# Patient Record
Sex: FEMALE | Race: WHITE | NOT HISPANIC OR LATINO | Employment: FULL TIME | URBAN - METROPOLITAN AREA
[De-identification: names, ages, dates, MRNs, and addresses within clinical notes are randomized per-mention and may not be internally consistent; named-entity substitution may affect disease eponyms.]

---

## 2017-02-10 ENCOUNTER — OFFICE VISIT (OUTPATIENT)
Dept: OBSTETRICS AND GYNECOLOGY | Facility: CLINIC | Age: 22
End: 2017-02-10

## 2017-02-10 VITALS
BODY MASS INDEX: 34.88 KG/M2 | SYSTOLIC BLOOD PRESSURE: 120 MMHG | HEIGHT: 67 IN | WEIGHT: 222.2 LBS | DIASTOLIC BLOOD PRESSURE: 78 MMHG

## 2017-02-10 DIAGNOSIS — N63.0 BREAST LUMP: Primary | ICD-10-CM

## 2017-02-10 PROCEDURE — 99213 OFFICE O/P EST LOW 20 MIN: CPT | Performed by: NURSE PRACTITIONER

## 2017-02-10 NOTE — PROGRESS NOTES
Subjective   Ashley Lobo is a 21 y.o. female presents with c/o left breast lump    History of Present Illness  Ashley noticed a left breast lump about a month ago. She says the area is tender with palpation. She denies right breast lumps. She denies skin changes, dimpling, nipple retraction, swelling, erythema, and nipple discharge bilaterally. She denies fever. Denies family hx breast cancer. Last annual exam with Dr. Muir about a year ago per pt. No other complaints today.     The following portions of the patient's history were reviewed and updated as appropriate: allergies, current medications, past family history, past medical history, past social history, past surgical history and problem list.    Review of Systems  See HPI    Objective   Physical Exam   Pulmonary/Chest: Right breast exhibits no inverted nipple, no nipple discharge, no skin change and no tenderness. Left breast exhibits mass. Left breast exhibits no inverted nipple, no nipple discharge, no skin change and no tenderness. Breasts are symmetrical. There is no breast swelling.       2cm mobile, tender lump noted 1:00 left breast several inches from nipple.          Assessment/Plan   Ashley was seen today for breast mass.    Diagnoses and all orders for this visit:    Breast lump  -     US breast left complete      We will arrange a left breast U/S for Ashley. She will f/u in 3-4 weeks for her annual exam and a breast recheck. Recommend decrease caffeine. Ashley and her mother verbalize understanding and agree with plan.    SULMA Bolanos

## 2017-02-20 ENCOUNTER — HOSPITAL ENCOUNTER (OUTPATIENT)
Dept: ULTRASOUND IMAGING | Facility: HOSPITAL | Age: 22
Discharge: HOME | End: 2017-02-20
Admitting: NURSE PRACTITIONER

## 2017-02-20 PROCEDURE — 76641 ULTRASOUND BREAST COMPLETE: CPT

## 2017-02-21 ENCOUNTER — TELEPHONE (OUTPATIENT)
Dept: OBSTETRICS AND GYNECOLOGY | Facility: CLINIC | Age: 22
End: 2017-02-21

## 2017-02-21 DIAGNOSIS — N63.0 BREAST LUMP: Primary | ICD-10-CM

## 2017-02-21 NOTE — TELEPHONE ENCOUNTER
Pt's mother informed of date, time, and location for appt with Dr. Toribio. She verbalizes understanding.

## 2017-03-03 ENCOUNTER — OFFICE VISIT (OUTPATIENT)
Dept: MAMMOGRAPHY | Facility: CLINIC | Age: 22
End: 2017-03-03

## 2017-03-03 VITALS
DIASTOLIC BLOOD PRESSURE: 75 MMHG | OXYGEN SATURATION: 96 % | SYSTOLIC BLOOD PRESSURE: 105 MMHG | BODY MASS INDEX: 36 KG/M2 | HEIGHT: 66 IN | HEART RATE: 96 BPM | TEMPERATURE: 97.9 F | WEIGHT: 224 LBS

## 2017-03-03 DIAGNOSIS — N63.0 LUMP OR MASS IN BREAST: Primary | ICD-10-CM

## 2017-03-03 PROCEDURE — 99204 OFFICE O/P NEW MOD 45 MIN: CPT | Performed by: SURGERY

## 2017-03-03 RX ORDER — DIAZEPAM 2 MG/1
10 TABLET ORAL ONCE
Status: CANCELLED | OUTPATIENT
Start: 2017-03-03 | End: 2017-03-03

## 2017-03-03 RX ORDER — CEFAZOLIN SODIUM 2 G/100ML
2 INJECTION, SOLUTION INTRAVENOUS ONCE
Status: CANCELLED | OUTPATIENT
Start: 2017-03-03 | End: 2017-03-03

## 2017-03-03 NOTE — PROGRESS NOTES
Chief Complaint: Ashley Lobo is a 21 y.o.. female here today for Cyst (left breast)        History of Present Illness:  Patient presents with breast mass x2.  Approximately 1 month ago she rubs her arm against the lateral aspect of the left breast and noticed a lump there.  It was initially not tender but has become so with frequent evaluation.  It is not associated in anyway with her menstrual cycle and she has had no prior breast lumps.  The patient describes the lump as the size of an almond.  Imaging studies in the form of an ultrasound have been performed and in the 1 o'clock position of the left breast there was a 2.2 2.1 solid mass that was well circumscribed.  There was a second nodule in the 12:00 retroareolar location that measured 1.7 x 1.8 cm.  It was also smooth but solid.  I have personally reviewed these imaging studies and agree with the reports.  The family history is negative for breast cancer.  There was a great-grandmother with colon cancer.      Review of Systems:  Review of Systems   All other systems reviewed and are negative.       Past Medical and Surgical History:  Breast Biopsy History:  Patient has not had a breast biopsy in the past.  Breast Cancer HIstory:  Patient does not have a past medical history of breast cancer.  Breast Operations, and year:  None    History   Smoking Status   • Never Smoker   Smokeless Tobacco   • Not on file     Obstetric History:  Patient is premenopausal, first day of last period: Feb 2017 sometime  Number of pregnancies:0  Number of live births: 0  Number of abortions or miscarriages: 0  Length of time taking birth control pills:1 year  Patient has never taken hormone replacement    Past Surgical History   Procedure Laterality Date   • Back surgery         Past Medical History   Diagnosis Date   • Scoliosis        Prior Hospitalizations, other than for surgery or childbirth, and year:  none    Social History:  Patient is .  Patient has no  children.    Family History:  Family History   Problem Relation Age of Onset   • Colon cancer Other    • Diabetes Mother    • Hyperlipidemia Mother    • Hypertension Mother    • Hyperlipidemia Father    • COPD Maternal Grandmother      great grandmother   • Depression Maternal Grandmother    • Hearing loss Maternal Grandmother    • Heart attack Maternal Grandfather    • Hyperlipidemia Maternal Grandfather    • Alcohol abuse Paternal Grandfather    • Birth defects Paternal Grandfather    • Heart attack Paternal Grandfather    • Hyperlipidemia Paternal Grandfather        Vital Signs:  Vitals:    03/03/17 1024   BP: 105/75   Pulse: 96   Temp: 97.9 °F (36.6 °C)   SpO2: 96%       Medications:    Current Outpatient Prescriptions:   •  Prenatal Vit-Min-FA-Fish Oil ([employer] PRENATAL GUMMY PO), Take 2 tablets by mouth daily., Disp: , Rfl:      Physical Examination:  General Appearance:   Patient is in no distress.  She is well kept and has an obese build.   Psychiatric:  Patient with appropriate mood and affect. Alert and oriented to self, time, and place.    Breast, RIGHT:  medium sized, symmetric with the contralateral side.  Breast skin is without erythema, edema, rashes.  There are no visible abnormalities upon inspection during the arm-raising maneuver or with hands on hips in the sitting position. There is no nipple retraction, discharge or nipple/areolar skin changes.There are no masses palpable in the sitting or supine positions.    Breast, LEFT:  medium sized, symmetric with the contralateral side.  Breast skin is without erythema, edema, rashes.  There are no visible abnormalities upon inspection during the arm-raising maneuver or with hands on hips in the sitting position. There is no nipple retraction, discharge or nipple/areolar skin changes.There is a 2 x 1.5 cm smooth mobile mass in the upper outer quadrant of the left breast approximately 2 fingerbreadths from the edge of the areola.  There is a questionable 1 cm  nodule in the 11 o'clock position behind the areola.    Lymphatic:  There is no axillary, cervical, infraclavicular, or supraclavicular adenopathy bilaterally.  Eyes:  Pupils are round and reactive to light.  Cardiovascular:  Heart rate and rhythm are regular.  Respiratory:  Lungs are clear bilaterally with no crackles or wheezes in any lung field.  Gastrointestinal:  Abdomen is soft, nondistended, and nontender.  There is no evidence of hepatosplenomegaly.  There are no scars from previous surgery.    Musculoskeletal:  Good strength in all 4 extremities.   There is good range of motion in both shoulders.    Skin:  The patient has several tattoos.    Assessment:  Diagnoses and all orders for this visit:    Lump or mass in breast      Plan:  He was accompanied by her mother today.  I gave her the option of a needle biopsy of one of these lesions and if it proved to be a fibroadenoma we could just watch both of them with physical exam and ultrasound.  The other option would be to excise the mass and if we did that I would recommend removing both of them rather than having to continue following the lesion we did not remove.  I believe these lesions both represent fibroadenomas which would be the expected pathology given her age group and the imaging findings.  She prefers to excise these lesions and we will set her up for that.  She understands that the smaller lesion will require ultrasound localization to be certain we are finding that.  The larger one is easily palpable and doesn't require any localization.      CPT coding:    Next Appointment:  No Follow-up on file.

## 2017-03-27 ENCOUNTER — APPOINTMENT (OUTPATIENT)
Dept: PREADMISSION TESTING | Facility: HOSPITAL | Age: 22
End: 2017-03-27

## 2017-03-27 VITALS
DIASTOLIC BLOOD PRESSURE: 87 MMHG | RESPIRATION RATE: 16 BRPM | OXYGEN SATURATION: 100 % | SYSTOLIC BLOOD PRESSURE: 126 MMHG | BODY MASS INDEX: 35.68 KG/M2 | WEIGHT: 222 LBS | TEMPERATURE: 98.1 F | HEART RATE: 99 BPM | HEIGHT: 66 IN

## 2017-03-27 LAB
DEPRECATED RDW RBC AUTO: 42.1 FL (ref 37–54)
ERYTHROCYTE [DISTWIDTH] IN BLOOD BY AUTOMATED COUNT: 13 % (ref 11.7–13)
HCT VFR BLD AUTO: 42.5 % (ref 35.6–45.5)
HGB BLD-MCNC: 14.2 G/DL (ref 11.9–15.5)
MCH RBC QN AUTO: 29.5 PG (ref 26.9–32)
MCHC RBC AUTO-ENTMCNC: 33.4 G/DL (ref 32.4–36.3)
MCV RBC AUTO: 88.2 FL (ref 80.5–98.2)
PLATELET # BLD AUTO: 280 10*3/MM3 (ref 140–500)
PMV BLD AUTO: 9.8 FL (ref 6–12)
RBC # BLD AUTO: 4.82 10*6/MM3 (ref 3.9–5.2)
WBC NRBC COR # BLD: 8.11 10*3/MM3 (ref 4.5–10.7)

## 2017-03-27 PROCEDURE — 93005 ELECTROCARDIOGRAM TRACING: CPT

## 2017-03-27 PROCEDURE — 36415 COLL VENOUS BLD VENIPUNCTURE: CPT

## 2017-03-27 PROCEDURE — 93010 ELECTROCARDIOGRAM REPORT: CPT | Performed by: INTERNAL MEDICINE

## 2017-03-27 PROCEDURE — 85027 COMPLETE CBC AUTOMATED: CPT | Performed by: SURGERY

## 2017-04-03 ENCOUNTER — ANESTHESIA (OUTPATIENT)
Dept: PERIOP | Facility: HOSPITAL | Age: 22
End: 2017-04-03

## 2017-04-03 ENCOUNTER — HOSPITAL ENCOUNTER (OUTPATIENT)
Dept: ULTRASOUND IMAGING | Facility: HOSPITAL | Age: 22
Discharge: HOME | End: 2017-04-03
Attending: SURGERY

## 2017-04-03 ENCOUNTER — ANESTHESIA EVENT (OUTPATIENT)
Dept: PERIOP | Facility: HOSPITAL | Age: 22
End: 2017-04-03

## 2017-04-03 ENCOUNTER — HOSPITAL ENCOUNTER (OUTPATIENT)
Facility: HOSPITAL | Age: 22
Setting detail: HOSPITAL OUTPATIENT SURGERY
Discharge: HOME | End: 2017-04-03
Attending: SURGERY | Admitting: SURGERY

## 2017-04-03 VITALS
SYSTOLIC BLOOD PRESSURE: 122 MMHG | RESPIRATION RATE: 16 BRPM | TEMPERATURE: 97.1 F | HEART RATE: 78 BPM | DIASTOLIC BLOOD PRESSURE: 84 MMHG | OXYGEN SATURATION: 99 %

## 2017-04-03 DIAGNOSIS — N63.0 LUMP OR MASS IN BREAST: ICD-10-CM

## 2017-04-03 LAB
B-HCG UR QL: NEGATIVE
INTERNAL NEGATIVE CONTROL: NEGATIVE
INTERNAL POSITIVE CONTROL: POSITIVE
Lab: NORMAL

## 2017-04-03 PROCEDURE — 88305 TISSUE EXAM BY PATHOLOGIST: CPT | Performed by: SURGERY

## 2017-04-03 PROCEDURE — 25010000002 ONDANSETRON PER 1 MG: Performed by: NURSE ANESTHETIST, CERTIFIED REGISTERED

## 2017-04-03 PROCEDURE — 25010000002 FENTANYL CITRATE (PF) 100 MCG/2ML SOLUTION: Performed by: ANESTHESIOLOGY

## 2017-04-03 PROCEDURE — 25010000002 DEXAMETHASONE PER 1 MG: Performed by: NURSE ANESTHETIST, CERTIFIED REGISTERED

## 2017-04-03 PROCEDURE — 25010000002 FENTANYL CITRATE (PF) 100 MCG/2ML SOLUTION: Performed by: NURSE ANESTHETIST, CERTIFIED REGISTERED

## 2017-04-03 PROCEDURE — 25010000002 PROPOFOL 10 MG/ML EMULSION: Performed by: NURSE ANESTHETIST, CERTIFIED REGISTERED

## 2017-04-03 PROCEDURE — 25010000003 CEFAZOLIN IN DEXTROSE 2-4 GM/100ML-% SOLUTION: Performed by: SURGERY

## 2017-04-03 PROCEDURE — 25010000002 PHENYLEPHRINE PER 1 ML: Performed by: NURSE ANESTHETIST, CERTIFIED REGISTERED

## 2017-04-03 PROCEDURE — 25010000002 MIDAZOLAM PER 1 MG: Performed by: ANESTHESIOLOGY

## 2017-04-03 PROCEDURE — 19120 REMOVAL OF BREAST LESION: CPT | Performed by: SURGERY

## 2017-04-03 PROCEDURE — 25010000002 KETOROLAC TROMETHAMINE PER 15 MG: Performed by: NURSE ANESTHETIST, CERTIFIED REGISTERED

## 2017-04-03 RX ORDER — OXYCODONE AND ACETAMINOPHEN 7.5; 325 MG/1; MG/1
1 TABLET ORAL ONCE AS NEEDED
Status: DISCONTINUED | OUTPATIENT
Start: 2017-04-03 | End: 2017-04-03 | Stop reason: HOSPADM

## 2017-04-03 RX ORDER — LIDOCAINE HYDROCHLORIDE 20 MG/ML
INJECTION, SOLUTION INFILTRATION; PERINEURAL AS NEEDED
Status: DISCONTINUED | OUTPATIENT
Start: 2017-04-03 | End: 2017-04-03 | Stop reason: SURG

## 2017-04-03 RX ORDER — PROMETHAZINE HYDROCHLORIDE 25 MG/1
25 TABLET ORAL ONCE AS NEEDED
Status: DISCONTINUED | OUTPATIENT
Start: 2017-04-03 | End: 2017-04-03 | Stop reason: HOSPADM

## 2017-04-03 RX ORDER — LIDOCAINE HYDROCHLORIDE 10 MG/ML
20 INJECTION, SOLUTION INFILTRATION; PERINEURAL ONCE
Status: COMPLETED | OUTPATIENT
Start: 2017-04-03 | End: 2017-04-03

## 2017-04-03 RX ORDER — NALOXONE HCL 0.4 MG/ML
0.2 VIAL (ML) INJECTION AS NEEDED
Status: DISCONTINUED | OUTPATIENT
Start: 2017-04-03 | End: 2017-04-03 | Stop reason: HOSPADM

## 2017-04-03 RX ORDER — PROMETHAZINE HYDROCHLORIDE 25 MG/ML
12.5 INJECTION, SOLUTION INTRAMUSCULAR; INTRAVENOUS ONCE AS NEEDED
Status: DISCONTINUED | OUTPATIENT
Start: 2017-04-03 | End: 2017-04-03 | Stop reason: HOSPADM

## 2017-04-03 RX ORDER — PROPOFOL 10 MG/ML
VIAL (ML) INTRAVENOUS AS NEEDED
Status: DISCONTINUED | OUTPATIENT
Start: 2017-04-03 | End: 2017-04-03 | Stop reason: SURG

## 2017-04-03 RX ORDER — HYDROCODONE BITARTRATE AND ACETAMINOPHEN 7.5; 325 MG/1; MG/1
1 TABLET ORAL ONCE AS NEEDED
Status: COMPLETED | OUTPATIENT
Start: 2017-04-03 | End: 2017-04-03

## 2017-04-03 RX ORDER — MAGNESIUM HYDROXIDE 1200 MG/15ML
LIQUID ORAL AS NEEDED
Status: DISCONTINUED | OUTPATIENT
Start: 2017-04-03 | End: 2017-04-03 | Stop reason: HOSPADM

## 2017-04-03 RX ORDER — FENTANYL CITRATE 50 UG/ML
INJECTION, SOLUTION INTRAMUSCULAR; INTRAVENOUS AS NEEDED
Status: DISCONTINUED | OUTPATIENT
Start: 2017-04-03 | End: 2017-04-03 | Stop reason: SURG

## 2017-04-03 RX ORDER — FENTANYL CITRATE 50 UG/ML
50 INJECTION, SOLUTION INTRAMUSCULAR; INTRAVENOUS
Status: DISCONTINUED | OUTPATIENT
Start: 2017-04-03 | End: 2017-04-03 | Stop reason: HOSPADM

## 2017-04-03 RX ORDER — PROMETHAZINE HYDROCHLORIDE 25 MG/1
25 SUPPOSITORY RECTAL ONCE AS NEEDED
Status: DISCONTINUED | OUTPATIENT
Start: 2017-04-03 | End: 2017-04-03 | Stop reason: HOSPADM

## 2017-04-03 RX ORDER — MIDAZOLAM HYDROCHLORIDE 1 MG/ML
1 INJECTION INTRAMUSCULAR; INTRAVENOUS
Status: DISCONTINUED | OUTPATIENT
Start: 2017-04-03 | End: 2017-04-03 | Stop reason: HOSPADM

## 2017-04-03 RX ORDER — HYDRALAZINE HYDROCHLORIDE 20 MG/ML
5 INJECTION INTRAMUSCULAR; INTRAVENOUS
Status: DISCONTINUED | OUTPATIENT
Start: 2017-04-03 | End: 2017-04-03 | Stop reason: HOSPADM

## 2017-04-03 RX ORDER — LABETALOL HYDROCHLORIDE 5 MG/ML
5 INJECTION, SOLUTION INTRAVENOUS
Status: DISCONTINUED | OUTPATIENT
Start: 2017-04-03 | End: 2017-04-03 | Stop reason: HOSPADM

## 2017-04-03 RX ORDER — DIPHENHYDRAMINE HYDROCHLORIDE 50 MG/ML
12.5 INJECTION INTRAMUSCULAR; INTRAVENOUS
Status: DISCONTINUED | OUTPATIENT
Start: 2017-04-03 | End: 2017-04-03 | Stop reason: HOSPADM

## 2017-04-03 RX ORDER — SODIUM CHLORIDE, SODIUM LACTATE, POTASSIUM CHLORIDE, CALCIUM CHLORIDE 600; 310; 30; 20 MG/100ML; MG/100ML; MG/100ML; MG/100ML
9 INJECTION, SOLUTION INTRAVENOUS CONTINUOUS
Status: DISCONTINUED | OUTPATIENT
Start: 2017-04-03 | End: 2017-04-03 | Stop reason: HOSPADM

## 2017-04-03 RX ORDER — MIDAZOLAM HYDROCHLORIDE 1 MG/ML
2 INJECTION INTRAMUSCULAR; INTRAVENOUS
Status: DISCONTINUED | OUTPATIENT
Start: 2017-04-03 | End: 2017-04-03 | Stop reason: HOSPADM

## 2017-04-03 RX ORDER — BUPIVACAINE HYDROCHLORIDE 2.5 MG/ML
INJECTION, SOLUTION INFILTRATION; PERINEURAL AS NEEDED
Status: DISCONTINUED | OUTPATIENT
Start: 2017-04-03 | End: 2017-04-03 | Stop reason: HOSPADM

## 2017-04-03 RX ORDER — FLUMAZENIL 0.1 MG/ML
0.2 INJECTION INTRAVENOUS AS NEEDED
Status: DISCONTINUED | OUTPATIENT
Start: 2017-04-03 | End: 2017-04-03 | Stop reason: HOSPADM

## 2017-04-03 RX ORDER — CEFAZOLIN SODIUM 2 G/100ML
2 INJECTION, SOLUTION INTRAVENOUS ONCE
Status: COMPLETED | OUTPATIENT
Start: 2017-04-03 | End: 2017-04-03

## 2017-04-03 RX ORDER — HYDROMORPHONE HYDROCHLORIDE 1 MG/ML
0.5 INJECTION, SOLUTION INTRAMUSCULAR; INTRAVENOUS; SUBCUTANEOUS
Status: DISCONTINUED | OUTPATIENT
Start: 2017-04-03 | End: 2017-04-03 | Stop reason: HOSPADM

## 2017-04-03 RX ORDER — PROMETHAZINE HYDROCHLORIDE 25 MG/1
12.5 TABLET ORAL ONCE AS NEEDED
Status: DISCONTINUED | OUTPATIENT
Start: 2017-04-03 | End: 2017-04-03 | Stop reason: HOSPADM

## 2017-04-03 RX ORDER — DEXAMETHASONE SODIUM PHOSPHATE 10 MG/ML
INJECTION INTRAMUSCULAR; INTRAVENOUS AS NEEDED
Status: DISCONTINUED | OUTPATIENT
Start: 2017-04-03 | End: 2017-04-03 | Stop reason: SURG

## 2017-04-03 RX ORDER — ONDANSETRON 2 MG/ML
4 INJECTION INTRAMUSCULAR; INTRAVENOUS ONCE AS NEEDED
Status: DISCONTINUED | OUTPATIENT
Start: 2017-04-03 | End: 2017-04-03 | Stop reason: HOSPADM

## 2017-04-03 RX ORDER — KETOROLAC TROMETHAMINE 30 MG/ML
INJECTION, SOLUTION INTRAMUSCULAR; INTRAVENOUS AS NEEDED
Status: DISCONTINUED | OUTPATIENT
Start: 2017-04-03 | End: 2017-04-03 | Stop reason: SURG

## 2017-04-03 RX ORDER — HYDROCODONE BITARTRATE AND ACETAMINOPHEN 5; 325 MG/1; MG/1
1-2 TABLET ORAL EVERY 4 HOURS PRN
Qty: 20 TABLET | Refills: 0 | Status: SHIPPED | OUTPATIENT
Start: 2017-04-03 | End: 2021-09-13

## 2017-04-03 RX ORDER — FAMOTIDINE 10 MG/ML
20 INJECTION, SOLUTION INTRAVENOUS ONCE
Status: COMPLETED | OUTPATIENT
Start: 2017-04-03 | End: 2017-04-03

## 2017-04-03 RX ORDER — SODIUM CHLORIDE 0.9 % (FLUSH) 0.9 %
1-10 SYRINGE (ML) INJECTION AS NEEDED
Status: DISCONTINUED | OUTPATIENT
Start: 2017-04-03 | End: 2017-04-03 | Stop reason: HOSPADM

## 2017-04-03 RX ORDER — DIAZEPAM 2 MG/1
10 TABLET ORAL ONCE
Status: COMPLETED | OUTPATIENT
Start: 2017-04-03 | End: 2017-04-03

## 2017-04-03 RX ORDER — ONDANSETRON 2 MG/ML
INJECTION INTRAMUSCULAR; INTRAVENOUS AS NEEDED
Status: DISCONTINUED | OUTPATIENT
Start: 2017-04-03 | End: 2017-04-03 | Stop reason: SURG

## 2017-04-03 RX ORDER — DIAZEPAM 5 MG/1
10 TABLET ORAL ONCE AS NEEDED
Status: DISCONTINUED | OUTPATIENT
Start: 2017-04-03 | End: 2017-04-03 | Stop reason: HOSPADM

## 2017-04-03 RX ADMIN — LIDOCAINE HYDROCHLORIDE 2 ML: 10 INJECTION, SOLUTION INFILTRATION; PERINEURAL at 12:37

## 2017-04-03 RX ADMIN — PHENYLEPHRINE HYDROCHLORIDE 100 MCG: 10 INJECTION INTRAVENOUS at 14:27

## 2017-04-03 RX ADMIN — PHENYLEPHRINE HYDROCHLORIDE 100 MCG: 10 INJECTION INTRAVENOUS at 14:33

## 2017-04-03 RX ADMIN — FENTANYL CITRATE 50 MCG: 50 INJECTION INTRAMUSCULAR; INTRAVENOUS at 13:59

## 2017-04-03 RX ADMIN — HYDROCODONE BITARTRATE AND ACETAMINOPHEN 1 TABLET: 7.5; 325 TABLET ORAL at 15:17

## 2017-04-03 RX ADMIN — PROPOFOL 200 MG: 10 INJECTION, EMULSION INTRAVENOUS at 13:42

## 2017-04-03 RX ADMIN — DIAZEPAM 10 MG: 5 TABLET ORAL at 10:51

## 2017-04-03 RX ADMIN — FENTANYL CITRATE 50 MCG: 50 INJECTION INTRAMUSCULAR; INTRAVENOUS at 12:49

## 2017-04-03 RX ADMIN — FAMOTIDINE 20 MG: 10 INJECTION, SOLUTION INTRAVENOUS at 12:50

## 2017-04-03 RX ADMIN — MIDAZOLAM 2 MG: 1 INJECTION INTRAMUSCULAR; INTRAVENOUS at 12:50

## 2017-04-03 RX ADMIN — FENTANYL CITRATE 50 MCG: 50 INJECTION INTRAMUSCULAR; INTRAVENOUS at 13:52

## 2017-04-03 RX ADMIN — DEXAMETHASONE SODIUM PHOSPHATE 8 MG: 10 INJECTION INTRAMUSCULAR; INTRAVENOUS at 13:52

## 2017-04-03 RX ADMIN — SODIUM CHLORIDE, POTASSIUM CHLORIDE, SODIUM LACTATE AND CALCIUM CHLORIDE: 600; 310; 30; 20 INJECTION, SOLUTION INTRAVENOUS at 13:37

## 2017-04-03 RX ADMIN — FENTANYL CITRATE 50 MCG: 50 INJECTION INTRAMUSCULAR; INTRAVENOUS at 14:06

## 2017-04-03 RX ADMIN — LIDOCAINE HYDROCHLORIDE 60 MG: 20 INJECTION, SOLUTION INFILTRATION; PERINEURAL at 13:42

## 2017-04-03 RX ADMIN — ONDANSETRON 4 MG: 2 INJECTION INTRAMUSCULAR; INTRAVENOUS at 13:52

## 2017-04-03 RX ADMIN — SODIUM CHLORIDE, POTASSIUM CHLORIDE, SODIUM LACTATE AND CALCIUM CHLORIDE 9 ML/HR: 600; 310; 30; 20 INJECTION, SOLUTION INTRAVENOUS at 12:49

## 2017-04-03 RX ADMIN — CEFAZOLIN SODIUM 2 G: 2 INJECTION, SOLUTION INTRAVENOUS at 13:38

## 2017-04-03 RX ADMIN — FENTANYL CITRATE 50 MCG: 50 INJECTION INTRAMUSCULAR; INTRAVENOUS at 13:42

## 2017-04-03 RX ADMIN — KETOROLAC TROMETHAMINE 30 MG: 30 INJECTION, SOLUTION INTRAMUSCULAR; INTRAVENOUS at 13:52

## 2017-04-03 NOTE — OP NOTE
Needle Localization Breast Biopsy Procedure Note    Indications: This patient presented with a palpable mass in the upper outer quadrant of the left breast.  Ultrasound revealed a 2 cm mass most likely a fibroadenoma.  There also found just another somewhat near mass was a little smaller and it was not palpable.  She is here to have removal of the palpable as well as the nonpalpable mass.    Pre-operative Diagnosis: Left breast mass ×2    Post-operative Diagnosis: Same    Procedure-1) Needle localized Left Breast excisional biopsy   2) excisional biopsy left breast mass    Surgeon: Duc Toribio MD.,  FACS    Assistants: None    Anesthesia: General      Procedure Details   The patient was seen in the Holding Room. The risks, benefits, complications, treatment options, and expected outcomes were discussed with the patient. The possibilities of reaction to medication, pulmonary aspiration, bleeding, infection, the need for additional procedures, failure to diagnose a condition, and creating a complication requiring transfusion or operation were discussed with the patient. The patient concurred with the proposed plan, giving informed consent.  The site of surgery properly noted/marked. The patient was taken to Operating Room; identified patient as Ashley Lobo  and the procedure verified as Needle localized left breast biopsy and excisional biopsy of the left breast mass.. A Time Out was held and the above information confirmed.    The patient underwent preoperative guidewire localization of a mammographic abnormality in the  12:00 aspect of the leftbreast.  The patient was brought to the operating room and placed supine.  The breast was prepped and draped in standard fashion. Marcaine  0.50% with epinephrine was used to anesthetize the skin at the site of the guidewire placement.  A curvilinear incision was created along the edge of the areola in the upper outer quadrant.  Dissection was carried down to the  localized area which was completely excised.  The mass was easily palpable and an x-ray was not taken.  We were then able to dissect into the 2 o'clock position through this same incision and identified the second mass which was palpable.  It was dissected from the surrounding tissues and removed intact..  Hemostasis was achieved with cautery.  We irrigated out the operative site.  Closure was performed  with interrupted 3-0 Vicryl sutures for the deeper layers and a 4-0 Vicryl subcuticular closure.      Steri-Strips were applied. At the end of the operation, all sponge, instrument, and needle counts were correct.    Findings:  There were no unexpected findings  Estimated Blood Loss:  Minimal           Drains: none          Specimens: Left breast mass at 12:00 and left breast mass at 2:00                    Complications:  None; patient tolerated the procedure well.           Condition: stable

## 2017-04-03 NOTE — PLAN OF CARE
Problem: Patient Care Overview (Adult)  Goal: Adult Individualization and Mutuality  Outcome: Outcome(s) achieved Date Met:  04/03/17

## 2017-04-03 NOTE — PLAN OF CARE
Problem: Patient Care Overview (Adult)  Goal: Discharge Needs Assessment  Outcome: Outcome(s) achieved Date Met:  04/03/17

## 2017-04-03 NOTE — ANESTHESIA POSTPROCEDURE EVALUATION
Patient: Ashley Lobo    Procedure Summary     Date Anesthesia Start Anesthesia Stop Room / Location    04/03/17 [id number]  [facility]  /  [facility]       Procedure Diagnosis Surgeon Provider    LEFT BREAST BIOPSY WITH ULTRASOUND NEEDLE LOCALIZATION AND EXCISIONAL BIOPSY OF SECOND MASS IN LEFT BREAST (Left Breast) Lump or mass in breast  (Lump or mass in breast [N63]) MD Abram Ruiz MD          Anesthesia Type: general  Last vitals  /84 (04/03/17 1515)    Temp 36.3 °C (97.3 °F) (04/03/17 1444)    Pulse 83 (04/03/17 1515)   Resp 18 (04/03/17 1515)    SpO2 100 % (04/03/17 1515)      Post Anesthesia Care and Evaluation    Patient location during evaluation: PACU  Patient participation: complete - patient participated  Level of consciousness: awake and alert  Pain management: adequate  Airway patency: patent  Anesthetic complications: No anesthetic complications    Cardiovascular status: acceptable  Respiratory status: acceptable  Hydration status: acceptable

## 2017-04-03 NOTE — PLAN OF CARE
Problem: Perioperative Period (Adult)  Goal: Signs and Symptoms of Listed Potential Problems Will be Absent or Manageable (Perioperative Period)  Outcome: Outcome(s) achieved Date Met:  04/03/17 04/03/17 0747   Perioperative Period   Problems Assessed (Perioperative Period) all   Problems Present (Perioperative Period) none

## 2017-04-03 NOTE — PLAN OF CARE
Problem: Patient Care Overview (Adult)  Goal: Plan of Care Review  Outcome: Outcome(s) achieved Date Met:  04/03/17 04/03/17 3527   Coping/Psychosocial Response Interventions   Plan Of Care Reviewed With patient   Patient Care Overview   Progress improving

## 2017-04-03 NOTE — PLAN OF CARE
Problem: Patient Care Overview (Adult)  Goal: Plan of Care Review  Outcome: Ongoing (interventions implemented as appropriate)    04/03/17 1034   Coping/Psychosocial Response Interventions   Plan Of Care Reviewed With patient   Patient Care Overview   Progress improving       Goal: Adult Individualization and Mutuality  Outcome: Ongoing (interventions implemented as appropriate)  Goal: Discharge Needs Assessment  Outcome: Ongoing (interventions implemented as appropriate)    04/03/17 1034   Discharge Needs Assessment   Concerns To Be Addressed no discharge needs identified

## 2017-04-03 NOTE — PLAN OF CARE
Problem: Perioperative Period (Adult)  Goal: Signs and Symptoms of Listed Potential Problems Will be Absent or Manageable (Perioperative Period)  Outcome: Ongoing (interventions implemented as appropriate)    04/03/17 1034   Perioperative Period   Problems Assessed (Perioperative Period) all   Problems Present (Perioperative Period) none

## 2017-04-03 NOTE — PLAN OF CARE
Problem: Perioperative Period (Adult)  Goal: Signs and Symptoms of Listed Potential Problems Will be Absent or Manageable (Perioperative Period)  Outcome: Ongoing (interventions implemented as appropriate)    04/03/17 1458   Perioperative Period   Problems Assessed (Perioperative Period) pain;wound complications;hypothermia;hypoxia/hypoxemia   Problems Present (Perioperative Period) none

## 2017-04-03 NOTE — H&P
History of Present Illness:  Patient presents with breast mass x2.  Approximately 1 month ago she rubs her arm against the lateral aspect of the left breast and noticed a lump there. It was initially not tender but has become so with frequent evaluation. It is not associated in anyway with her menstrual cycle and she has had no prior breast lumps. The patient describes the lump as the size of an almond. Imaging studies in the form of an ultrasound have been performed and in the 1 o'clock position of the left breast there was a 2.2 2.1 solid mass that was well circumscribed. There was a second nodule in the 12:00 retroareolar location that measured 1.7 x 1.8 cm. It was also smooth but solid. I have personally reviewed these imaging studies and agree with the reports.  The family history is negative for breast cancer. There was a great-grandmother with colon cancer.        Review of Systems:  Review of Systems   All other systems reviewed and are negative.        Past Medical and Surgical History:  Breast Biopsy History:  Patient has not had a breast biopsy in the past.  Breast Cancer HIstory:  Patient does not have a past medical history of breast cancer.  Breast Operations, and year:  None         History   Smoking Status   • Never Smoker   Smokeless Tobacco   • Not on file      Obstetric History:  Patient is premenopausal, first day of last period: Feb 2017 sometime  Number of pregnancies:0  Number of live births: 0  Number of abortions or miscarriages: 0  Length of time taking birth control pills:1 year  Patient has never taken hormone replacement      Surgical History          Past Surgical History   Procedure Laterality Date   • Back surgery                 Medical History         Past Medical History   Diagnosis Date   • Scoliosis              Prior Hospitalizations, other than for surgery or childbirth, and year:  none     Social History:  Patient is .  Patient has no children.     Family History:          Family History   Problem Relation Age of Onset   • Colon cancer Other     • Diabetes Mother     • Hyperlipidemia Mother     • Hypertension Mother     • Hyperlipidemia Father     • COPD Maternal Grandmother         great grandmother   • Depression Maternal Grandmother     • Hearing loss Maternal Grandmother     • Heart attack Maternal Grandfather     • Hyperlipidemia Maternal Grandfather     • Alcohol abuse Paternal Grandfather     • Birth defects Paternal Grandfather     • Heart attack Paternal Grandfather     • Hyperlipidemia Paternal Grandfather           Vital Signs:      Vitals: 4/3/2017         BP: 137/95     Pulse: 102     Temp: 98.2     SpO2: 100%           Medications:     Current Outpatient Prescriptions:   • Prenatal Vit-Min-FA-Fish Oil ([employer] PRENATAL GUMMY PO), Take 2 tablets by mouth daily., Disp: , Rfl:      Physical Examination:  General Appearance:   Patient is in no distress.  She is well kept and has an obese build.   Psychiatric:  Patient with appropriate mood and affect. Alert and oriented to self, time, and place.     Breast, RIGHT: medium sized, symmetric with the contralateral side.  Breast skin is without erythema, edema, rashes.  There are no visible abnormalities upon inspection during the arm-raising maneuver or with hands on hips in the sitting position. There is no nipple retraction, discharge or nipple/areolar skin changes.There are no masses palpable in the sitting or supine positions.     Breast, LEFT:  medium sized, symmetric with the contralateral side.  Breast skin is without erythema, edema, rashes.  There are no visible abnormalities upon inspection during the arm-raising maneuver or with hands on hips in the sitting position. There is no nipple retraction, discharge or nipple/areolar skin changes.There is a 2 x 1.5 cm smooth mobile mass in the upper outer quadrant of the left breast approximately 2 fingerbreadths from the edge of the areola. There is a questionable 1 cm nodule  in the 11 o'clock position behind the areola.     Lymphatic:  There is no axillary, cervical, infraclavicular, or supraclavicular adenopathy bilaterally.  Eyes:  Pupils are round and reactive to light.  Cardiovascular:  Heart rate and rhythm are regular.  Respiratory:  Lungs are clear bilaterally with no crackles or wheezes in any lung field.  Gastrointestinal:  Abdomen is soft, nondistended, and nontender.  There is no evidence of hepatosplenomegaly. There are no scars from previous surgery.     Musculoskeletal:  Good strength in all 4 extremities.   There is good range of motion in both shoulders.     Skin:  The patient has several tattoos.     Assessment:  Diagnoses and all orders for this visit:     Lump or mass in breast    Plan-she was given the option of observation versus removal of one of the masses and following the remaining 1 or to remove both of them.  She prefers removal of both of them and understands that the smaller one will require ultrasound localization to be certain we are finding it.  She wishes to proceed and understands the small risk of infection, bleeding, and anesthesia.

## 2017-04-03 NOTE — ANESTHESIA PROCEDURE NOTES
Airway  Airway not difficult    General Information and Staff    Patient location during procedure: OR  Anesthesiologist: MARIAH JONES  CRNA: KE NOVAK    Indications and Patient Condition  Indications for airway management: airway protection    Preoxygenated: yes  MILS maintained throughout  Mask difficulty assessment: 1 - vent by mask    Final Airway Details  Final airway type: supraglottic airway      Successful airway: classic  Size 4    Number of attempts at approach: 1    Additional Comments  Pt preoxygenated, sivi, LMA placed with adequate seal and TV

## 2017-04-05 ENCOUNTER — TELEPHONE (OUTPATIENT)
Dept: SURGERY | Facility: CLINIC | Age: 22
End: 2017-04-05

## 2017-04-05 LAB
CYTO UR: NORMAL
LAB AP CASE REPORT: NORMAL
Lab: NORMAL
PATH REPORT.FINAL DX SPEC: NORMAL
PATH REPORT.GROSS SPEC: NORMAL

## 2017-04-05 NOTE — TELEPHONE ENCOUNTER
I told her the path report revealed 2 fibroadenomas.  She is doing well and I will see her back in the office in 2 weeks.

## 2017-04-18 ENCOUNTER — OFFICE VISIT (OUTPATIENT)
Dept: MAMMOGRAPHY | Facility: CLINIC | Age: 22
End: 2017-04-18

## 2017-04-18 VITALS
TEMPERATURE: 97.8 F | SYSTOLIC BLOOD PRESSURE: 115 MMHG | DIASTOLIC BLOOD PRESSURE: 80 MMHG | OXYGEN SATURATION: 97 % | HEART RATE: 72 BPM

## 2017-04-18 DIAGNOSIS — D24.2 FIBROADENOMA OF BREAST, LEFT: Primary | ICD-10-CM

## 2017-04-18 PROCEDURE — 99024 POSTOP FOLLOW-UP VISIT: CPT | Performed by: SURGERY

## 2017-04-18 NOTE — PROGRESS NOTES
Chief Complaint: Ashley Lobo is a  21 y.o. female, initially referred by No ref. provider found , who is here today for a postoperative visit.    History of Present Illness:  In the interim,Ashley Lobo has had the following procedure and resultant pathology report: She underwent excisional biopsy of 2 masses in the left breast.  The path report has returned fibroadenoma with no atypia.    She has noted no redness, warmth,drainage, swelling at the incision site. Denies fever or chills.      Current Outpatient Prescriptions:   •  HYDROcodone-acetaminophen (NORCO) 5-325 MG per tablet, Take 1-2 tablets by mouth Every 4 (Four) Hours As Needed (Pain)., Disp: 20 tablet, Rfl: 0  Physical examination  Left breast-the Steri-Strips to been removed.  There is some dried blood across the incision but otherwise it is healing nicely with the usual amount of firmness but no evidence of infection or drainage  Assessment:  Fibroadenoma left breast status post excision-she is doing well from the procedure and has been instructed that she may potentially have another fibroadenoma in the future.  She does not need to do any additional imaging studies at this point in time but to begin screening mammography at the appropriate time.    Plan:  I will just see her on an as-needed basis.

## 2017-05-23 ENCOUNTER — OFFICE VISIT (OUTPATIENT)
Dept: OBSTETRICS AND GYNECOLOGY | Facility: CLINIC | Age: 22
End: 2017-05-23

## 2017-05-23 ENCOUNTER — PROCEDURE VISIT (OUTPATIENT)
Dept: OBSTETRICS AND GYNECOLOGY | Facility: CLINIC | Age: 22
End: 2017-05-23

## 2017-05-23 VITALS — DIASTOLIC BLOOD PRESSURE: 80 MMHG | SYSTOLIC BLOOD PRESSURE: 138 MMHG

## 2017-05-23 DIAGNOSIS — R10.2 PELVIC PAIN IN FEMALE: ICD-10-CM

## 2017-05-23 DIAGNOSIS — R10.2 PELVIC PAIN IN FEMALE: Primary | ICD-10-CM

## 2017-05-23 DIAGNOSIS — Z01.419 WELL WOMAN EXAM WITH ROUTINE GYNECOLOGICAL EXAM: Primary | ICD-10-CM

## 2017-05-23 DIAGNOSIS — L68.0 FAMILIAL HIRSUTISM: ICD-10-CM

## 2017-05-23 DIAGNOSIS — Z71.1 CONCERN ABOUT STD IN FEMALE WITHOUT DIAGNOSIS: ICD-10-CM

## 2017-05-23 PROCEDURE — 76830 TRANSVAGINAL US NON-OB: CPT | Performed by: OBSTETRICS & GYNECOLOGY

## 2017-05-23 PROCEDURE — 99395 PREV VISIT EST AGE 18-39: CPT | Performed by: OBSTETRICS & GYNECOLOGY

## 2017-05-23 RX ORDER — NORETHINDRONE ACETATE AND ETHINYL ESTRADIOL 1MG-20(21)
1 KIT ORAL DAILY
Qty: 28 TABLET | Refills: 12 | Status: SHIPPED | OUTPATIENT
Start: 2017-05-23 | End: 2018-05-23

## 2017-05-24 LAB
DHEA-S SERPL-MCNC: 256.3 UG/DL (ref 110–431.7)
HIV 1+2 AB+HIV1 P24 AG SERPL QL IA: NON REACTIVE
TESTOST SERPL-MCNC: 50 NG/DL (ref 8–48)
TSH SERPL DL<=0.005 MIU/L-ACNC: 2.33 MIU/ML (ref 0.27–4.2)

## 2017-05-26 LAB
C TRACH RRNA CVX QL NAA+PROBE: NEGATIVE
CONV .: NORMAL
CYTOLOGIST CVX/VAG CYTO: NORMAL
CYTOLOGY CVX/VAG DOC THIN PREP: NORMAL
DX ICD CODE: NORMAL
DX ICD CODE: NORMAL
HIV 1 & 2 AB SER-IMP: NORMAL
Lab: NORMAL
N GONORRHOEA RRNA CVX QL NAA+PROBE: NEGATIVE
OTHER STN SPEC: NORMAL
PATH REPORT.FINAL DX SPEC: NORMAL
STAT OF ADQ CVX/VAG CYTO-IMP: NORMAL

## 2017-06-06 RX ORDER — METRONIDAZOLE 500 MG/1
500 TABLET ORAL 2 TIMES DAILY
Qty: 14 TABLET | Refills: 3 | Status: SHIPPED | OUTPATIENT
Start: 2017-06-06 | End: 2017-06-13

## 2018-07-11 NOTE — DISCHARGE INSTRUCTIONS
Call patient    Tell her that her thyroid blood work is all normal  Take the following medications the morning of surgery with a small sip of water. NONE        General Instructions:  • Do not eat or drink after midnight: includes water, mints, or gum. You may brush your teeth.  • Do not smoke, chew tobacco, or drink alcohol.  • The Pre-Admission Testing nurse will instruct you to bring medications if unable to obtain an accurate list in Pre-Admission Testing.    • If applicable bring your C-PAP/ BI-PAP machine.  • Bring any papers given to you in the doctor’s office.  • Wear clean comfortable clothes and socks.  • Do not wear contact lenses or make-up.  Bring a case for your glasses if applicable.   • Bring crutches or walker if applicable.  • Leave all other valuables and jewelry at home.        Preventing a Surgical Site Infection:  Shower on the morning of surgery using a fresh bar of anti-bacterial soap (such as Dial) and clean washcloth.  Dry with a clean towel and dress in clean clothing.  For 2 to 3 days before surgery, avoid shaving with a razor near where you will have surgery because the razor can irritate skin and make it easier to develop an infection  Ask your surgeon if you will be receiving antibiotics prior to surgery  Make sure you, your family, and all healthcare providers clean their hands with soap and water or an alcohol based hand  before caring for you or your wound  If at all possible, quit smoking as many days before surgery as you can.    Day of surgery:4/3/2017. [facility]. ARRIVAL TIME 1030 AM  Upon arrival, a Pre-op nurse and Anesthesiologist will review your health history, obtain vital signs, and answer questions you may have.  The only belongings needed at this time will be your home medications and if applicable your C-PAP/BI-PAP machine.  If you are staying overnight your family can leave the rest of your belongings in the car and bring them to your room later.  A Pre-op nurse will start an IV and you may receive medication in preparation for  surgery, including something to help you relax.  Your family will be able to see you in the Pre-op area.  While you are in surgery your family should notify the waiting room  if they leave the waiting room area and provide a contact phone number.    Please be aware that surgery does come with discomfort.  We want to make every effort to control your discomfort so please discuss any uncontrolled symptoms with your nurse.   Your doctor will most likely have prescribed pain medications.      If you are going home after surgery you will receive individualized written care instructions before being discharged.  A responsible adult must drive you to and from the hospital on the day of your surgery and stay with you for 24 hours.        If you have any questions please call Pre-Admission Testing at [phone].  Deductibles and co-payments are collected on the day of service. Please be prepared to pay the required co-pay, deductible or deposit on the day of service as defined by your plan.

## 2018-09-18 ENCOUNTER — OFFICE VISIT (OUTPATIENT)
Dept: OBSTETRICS AND GYNECOLOGY | Facility: CLINIC | Age: 23
End: 2018-09-18

## 2018-09-18 VITALS
WEIGHT: 191 LBS | DIASTOLIC BLOOD PRESSURE: 78 MMHG | SYSTOLIC BLOOD PRESSURE: 120 MMHG | HEIGHT: 66 IN | BODY MASS INDEX: 30.7 KG/M2

## 2018-09-18 DIAGNOSIS — Z01.419 WELL WOMAN EXAM WITH ROUTINE GYNECOLOGICAL EXAM: Primary | ICD-10-CM

## 2018-09-18 DIAGNOSIS — Z30.41 ENCOUNTER FOR SURVEILLANCE OF CONTRACEPTIVE PILLS: ICD-10-CM

## 2018-09-18 PROCEDURE — 99395 PREV VISIT EST AGE 18-39: CPT | Performed by: OBSTETRICS & GYNECOLOGY

## 2018-09-18 RX ORDER — NORETHINDRONE ACETATE AND ETHINYL ESTRADIOL 1; .02 MG/1; MG/1
TABLET ORAL
COMMUNITY
End: 2021-09-13 | Stop reason: HOSPADM

## 2018-09-18 RX ORDER — TOPIRAMATE 50 MG/1
100 TABLET, FILM COATED ORAL
COMMUNITY
End: 2021-09-13

## 2018-09-18 RX ORDER — BUSPIRONE HYDROCHLORIDE 15 MG/1
15 TABLET ORAL
COMMUNITY
End: 2021-09-13

## 2018-09-18 NOTE — PROGRESS NOTES
Subjective   Ashley Lobo is a 23 y.o. female is here today as a self referral for annual.    History of Present Illness-here today for annual exam and checkup.    The following portions of the patient's history were reviewed and updated as appropriate: allergies, current medications, past family history, past medical history, past social history, past surgical history and problem list.    Review of Systems   Constitutional: Negative.    HENT: Negative.    Eyes: Negative.    Respiratory: Negative.    Cardiovascular: Negative.    Gastrointestinal: Negative.    Endocrine: Negative.    Genitourinary: Negative.    Musculoskeletal: Negative.    Skin: Negative.    Allergic/Immunologic: Negative.    Neurological: Negative.    Hematological: Negative.    Psychiatric/Behavioral: Negative.        Objective   Physical Exam   Constitutional: She is oriented to person, place, and time. She appears well-developed and well-nourished.   HENT:   Head: Normocephalic and atraumatic.   Nose: Nose normal.   Eyes: Pupils are equal, round, and reactive to light. Conjunctivae and EOM are normal.   Neck: Normal range of motion. Neck supple. No thyromegaly present.   Cardiovascular: Normal rate, regular rhythm, normal heart sounds and intact distal pulses.  Exam reveals no gallop.    No murmur heard.  Pulmonary/Chest: Effort normal and breath sounds normal. No respiratory distress. She has no wheezes. She exhibits no mass, no tenderness, no swelling and no retraction. Right breast exhibits no inverted nipple, no mass, no nipple discharge, no skin change and no tenderness. Left breast exhibits no inverted nipple, no mass, no nipple discharge, no skin change and no tenderness.   Abdominal: Soft. Bowel sounds are normal. She exhibits no distension and no mass. There is no tenderness.   Genitourinary: Rectum normal, vagina normal and uterus normal. There is no rash, tenderness, lesion or injury on the right labia. There is no rash,  tenderness, lesion or injury on the left labia. Uterus is not enlarged and not tender. Cervix exhibits no motion tenderness and no discharge. Right adnexum displays no mass, no tenderness and no fullness. Left adnexum displays no mass, no tenderness and no fullness.   Musculoskeletal: Normal range of motion. She exhibits no edema, tenderness or deformity.   Neurological: She is alert and oriented to person, place, and time.   Skin: Skin is warm and dry.   Psychiatric: She has a normal mood and affect. Her behavior is normal. Judgment and thought content normal.   Nursing note and vitals reviewed.        Assessment/Plan   Problems Addressed this Visit     None      Visit Diagnoses     Well woman exam with routine gynecological exam    -  Primary    Relevant Orders    IGP,rfx Aptima HPV All Pth    Encounter for surveillance of contraceptive pills            Pap smear done today.  Pills recently refilled and not needed today.

## 2018-09-20 LAB
CONV .: NORMAL
CYTOLOGIST CVX/VAG CYTO: NORMAL
CYTOLOGY CVX/VAG DOC THIN PREP: NORMAL
DX ICD CODE: NORMAL
HIV 1 & 2 AB SER-IMP: NORMAL
OTHER STN SPEC: NORMAL
PATH REPORT.FINAL DX SPEC: NORMAL
STAT OF ADQ CVX/VAG CYTO-IMP: NORMAL

## 2019-01-17 ENCOUNTER — APPOINTMENT (OUTPATIENT)
Dept: WOMENS IMAGING | Facility: HOSPITAL | Age: 24
End: 2019-01-17

## 2019-01-17 PROCEDURE — 76641 ULTRASOUND BREAST COMPLETE: CPT | Performed by: RADIOLOGY

## 2020-07-20 NOTE — ANESTHESIA PREPROCEDURE EVALUATION
Anesthesia Evaluation     Patient summary reviewed      Airway   Mallampati: II  no difficulty expected  Dental - normal exam     Pulmonary - negative pulmonary ROS and normal exam   Cardiovascular - negative cardio ROS and normal exam    ECG reviewed        Neuro/Psych  GI/Hepatic/Renal/Endo    (+) obesity,      Musculoskeletal     Abdominal    Substance History      OB/GYN          Other                                    Anesthesia Plan    ASA 2     general     Anesthetic plan and risks discussed with patient, mother and father.       Nicho Perry  Orthopaedic Surgery  [address] [place] [address]  Phone: [phone]  Fax: [phone]  Follow Up Time: 1-3 Days

## 2021-06-22 LAB — HM PAP SMEAR: NORMAL

## 2021-08-29 ENCOUNTER — PREP FOR SURGERY (OUTPATIENT)
Dept: OTHER | Facility: HOSPITAL | Age: 26
End: 2021-08-29

## 2021-08-29 DIAGNOSIS — D06.9 SEVERE DYSPLASIA OF CERVIX (CIN III): Primary | ICD-10-CM

## 2021-08-29 RX ORDER — SODIUM CHLORIDE, SODIUM LACTATE, POTASSIUM CHLORIDE, CALCIUM CHLORIDE 600; 310; 30; 20 MG/100ML; MG/100ML; MG/100ML; MG/100ML
150 INJECTION, SOLUTION INTRAVENOUS CONTINUOUS
Status: CANCELLED | OUTPATIENT
Start: 2021-08-29

## 2021-08-29 RX ORDER — SODIUM CHLORIDE 0.9 % (FLUSH) 0.9 %
3 SYRINGE (ML) INJECTION EVERY 12 HOURS SCHEDULED
Status: CANCELLED | OUTPATIENT
Start: 2021-08-29

## 2021-08-29 RX ORDER — ONDANSETRON 2 MG/ML
4 INJECTION INTRAMUSCULAR; INTRAVENOUS EVERY 6 HOURS PRN
Status: CANCELLED | OUTPATIENT
Start: 2021-08-29

## 2021-08-29 RX ORDER — SODIUM CHLORIDE 0.9 % (FLUSH) 0.9 %
10 SYRINGE (ML) INJECTION AS NEEDED
Status: CANCELLED | OUTPATIENT
Start: 2021-08-29

## 2021-09-12 PROBLEM — D06.9 SEVERE DYSPLASIA OF CERVIX (CIN III): Status: ACTIVE | Noted: 2021-09-12

## 2021-09-13 ENCOUNTER — OFFICE VISIT (OUTPATIENT)
Dept: OBSTETRICS AND GYNECOLOGY | Facility: CLINIC | Age: 26
End: 2021-09-13

## 2021-09-13 VITALS
HEART RATE: 86 BPM | SYSTOLIC BLOOD PRESSURE: 124 MMHG | BODY MASS INDEX: 37.12 KG/M2 | DIASTOLIC BLOOD PRESSURE: 86 MMHG | WEIGHT: 231 LBS | HEIGHT: 66 IN

## 2021-09-13 DIAGNOSIS — Z01.818 PREOP EXAMINATION: Primary | ICD-10-CM

## 2021-09-13 DIAGNOSIS — D06.9 SEVERE DYSPLASIA OF CERVIX (CIN III): ICD-10-CM

## 2021-09-13 PROBLEM — F31.9 BIPOLAR 1 DISORDER: Status: ACTIVE | Noted: 2018-07-30

## 2021-09-13 PROCEDURE — 99214 OFFICE O/P EST MOD 30 MIN: CPT | Performed by: OBSTETRICS & GYNECOLOGY

## 2021-09-13 NOTE — PROGRESS NOTES
GYN FU and History and Physical      Patient Name: Ashley Lobo  : 1995  MRN: [id number]    Subjective     Chief Complaint: HERI III    HPI:  26 y.o. [id number] no birth control.      ROS: All negative except listed in HPI    Personal History     PMHx:      Past Medical History:   Diagnosis Date   • Anxiety    • Arthritis    • Bipolar 1 disorder (CMS/HCC) 2018   • Cataract    • Chlamydia    • Depression    • Herpes    • Left breast mass     X2, s/p biopsy, benign   • Scoliosis    • Seasonal allergies    • Severe dysplasia of cervix (HERI III) 2021   • Urogenital trichomoniasis        OBHx:   G0    Home Medications:   None    Allergies:  No Known Allergies    PSHx:    Past Surgical History:   Procedure Laterality Date   • BACK SURGERY      TAVAREZ RODS FOR SCOLIOSIS   • BREAST BIOPSY Left 4/3/2017    Procedure: LEFT BREAST BIOPSY WITH ULTRASOUND NEEDLE LOCALIZATION AND EXCISIONAL BIOPSY OF SECOND MASS IN LEFT BREAST;  Surgeon: Duc Toribio MD;  Location: [facility] OR [facility];  Service:        Social History:    reports that she has never smoked. She has never used smokeless tobacco. She reports current alcohol use. She reports previous drug use. Drug: Marijuana.    Family History: Non contributory     Immunizations: Non contributory to this admission        Objective     Vitals:   Reviewed.      PHYSICAL EXAM:   General- NAD, alert and oriented, appropriate  Psych- Normal mood, good memory  CV- Regular rhythm, no murnurs  Resp- CTA to bases, no wheezes  Lymphatic- No palpable groin nodes  Rectal- Not examined.     Ext- No edema, bilaterally equal      Lab/Imaging/Other: Cvx bx 12, 6, 9 HERI III.  ECC suspicious for HGSIL.      Assessment / Plan     Assessment:  Diagnoses and all orders for this visit:    1. Preop examination (Primary)    2. Severe dysplasia of cervix (HERI III) Cvx Bx AND ECC (suspicious)  Overview:  Surgery to be scheduled in next 2-3mo (COVID restrictions per [facility]).      HERI  III on cvx bx and possible ECC    Plan:   `LEEP, top hat    `Counseling: Risks and benefits and alternatives of surgery were discussed with patient.  Risks are not limited to anesthesia, bleeding, blood transfusion, infection, damage to surrounding organs, wound separation, re-operation, thromboembolic disease, death.  See separate counseling note specific to procedure, written and signed today.      Signature: Electronically signed by Irma Almazan DO, 09/13/21, 2:02 PM EDT.

## 2021-10-22 ENCOUNTER — PRE-ADMISSION TESTING (OUTPATIENT)
Dept: PREADMISSION TESTING | Facility: HOSPITAL | Age: 26
End: 2021-10-22

## 2021-10-22 VITALS
HEART RATE: 75 BPM | OXYGEN SATURATION: 98 % | SYSTOLIC BLOOD PRESSURE: 138 MMHG | WEIGHT: 228.84 LBS | HEIGHT: 66 IN | TEMPERATURE: 98.6 F | DIASTOLIC BLOOD PRESSURE: 86 MMHG | BODY MASS INDEX: 36.78 KG/M2

## 2021-10-22 LAB
ABO GROUP BLD: NORMAL
BASOPHILS # BLD AUTO: 0.03 10*3/MM3 (ref 0–0.2)
BASOPHILS NFR BLD AUTO: 0.5 % (ref 0–1.5)
DEPRECATED RDW RBC AUTO: 37.9 FL (ref 37–54)
EOSINOPHIL # BLD AUTO: 0.08 10*3/MM3 (ref 0–0.4)
EOSINOPHIL NFR BLD AUTO: 1.3 % (ref 0.3–6.2)
ERYTHROCYTE [DISTWIDTH] IN BLOOD BY AUTOMATED COUNT: 11.8 % (ref 12.3–15.4)
HCG INTACT+B SERPL-ACNC: <0.5 MIU/ML
HCT VFR BLD AUTO: 41.3 % (ref 34–46.6)
HGB BLD-MCNC: 14 G/DL (ref 12–15.9)
IMM GRANULOCYTES # BLD AUTO: 0.02 10*3/MM3 (ref 0–0.05)
IMM GRANULOCYTES NFR BLD AUTO: 0.3 % (ref 0–0.5)
LYMPHOCYTES # BLD AUTO: 2.37 10*3/MM3 (ref 0.7–3.1)
LYMPHOCYTES NFR BLD AUTO: 37.4 % (ref 19.6–45.3)
MCH RBC QN AUTO: 29.5 PG (ref 26.6–33)
MCHC RBC AUTO-ENTMCNC: 33.9 G/DL (ref 31.5–35.7)
MCV RBC AUTO: 87.1 FL (ref 79–97)
MONOCYTES # BLD AUTO: 0.49 10*3/MM3 (ref 0.1–0.9)
MONOCYTES NFR BLD AUTO: 7.7 % (ref 5–12)
NEUTROPHILS NFR BLD AUTO: 3.35 10*3/MM3 (ref 1.7–7)
NEUTROPHILS NFR BLD AUTO: 52.8 % (ref 42.7–76)
NRBC BLD AUTO-RTO: 0 /100 WBC (ref 0–0.2)
PLATELET # BLD AUTO: 295 10*3/MM3 (ref 140–450)
PMV BLD AUTO: 9.8 FL (ref 6–12)
RBC # BLD AUTO: 4.74 10*6/MM3 (ref 3.77–5.28)
RH BLD: POSITIVE
WBC # BLD AUTO: 6.34 10*3/MM3 (ref 3.4–10.8)

## 2021-10-22 PROCEDURE — 85025 COMPLETE CBC W/AUTO DIFF WBC: CPT | Performed by: OBSTETRICS & GYNECOLOGY

## 2021-10-22 PROCEDURE — 86901 BLOOD TYPING SEROLOGIC RH(D): CPT

## 2021-10-22 PROCEDURE — 84702 CHORIONIC GONADOTROPIN TEST: CPT | Performed by: OBSTETRICS & GYNECOLOGY

## 2021-10-22 PROCEDURE — 86900 BLOOD TYPING SEROLOGIC ABO: CPT

## 2021-10-22 NOTE — DISCHARGE INSTRUCTIONS
IMPORTANT INSTRUCTIONS - PRE-ADMISSION TESTING  1. DO NOT EAT OR CHEW anything after midnight the night before your procedure.    2. You may have CLEAR liquids up to _2_ hours prior to ARRIVAL time.   3. Take the following medications the morning of your procedure with JUST A SIP OF WATER:  ___NONE ________________________________________________________________________________    4. DO NOT BRING your medications to the hospital with you, UNLESS something has changed since your PRE-Admission Testing appointment.  5. Hold all vitamins, supplements, and NSAIDS (Non- steroidal anti-inflammatory meds) for one week prior to surgery (you MAY take Tylenol or Acetaminophen).  6. If you are diabetic, check your blood sugar the morning of your procedure. If it is less than 70 or if you are feeling symptomatic, call the following number for further instructions: [phone]_.  7. Use your inhalers/nebulizers as usual, the morning of your procedure. BRING YOUR INHALERS with you.   8. Bring your CPAP or BIPAP to hospital, ONLY IF YOU WILL BE SPENDING THE NIGHT.   9. Make sure you have a ride home and have someone who will stay with you the day of your procedure after you go home.  10. If you have any questions, please call your Pre-Admission Testing Nurse, [place]____ at [phone]_____.   11. Per anesthesia request, do not smoke for 24 hours before your procedure or as instructed by your surgeon.      ••••••Clear Liquid Diet        Find out when you need to start a clear liquid diet.   Think of “clear liquids” as anything you could read a newspaper through. This includes things like water, broth, sports drinks, or tea WITHOUT any kind of milk or cream.           Once you are told to start a clear liquid diet, only drink these things until 2 hours before arrival to the hospital or when the hospital says to stop. Total volume limitation: 8 oz.       Clear liquids you CAN drink:   ; Water   ; Clear broth: beef, chicken,  vegetable, or bone broth with nothing in it   ; Gatorade   ; Lemonade or Jose-aid   ; Soda   ; Tea, coffee (NO cream or honey)   ; Jell-O (without fruit)   ; Popsicles (without fruit or cream)   ; Italian ices   ; Juice without pulp: apple, white, grape   ; You may use salt, pepper, and sugar    Do NOT drink:   ; Milk or cream   ; Soy milk, almond milk, coconut milk, or other non-dairy drinks and   creamers   ; Milkshakes or smoothies   ; Tomato juice   ; Orange juice   ; Grapefruit juice   ; Cream soups or any other than broth         Clear Liquid Diet:  ? Do NOT eat any solid food.  ? Do NOT eat or suck on mints or candy.  ? Do NOT chew gum.  ? Do NOT drink thick liquids like milk or juice with pulp in it.  ? Do NOT add milk, cream, or anything like soy milk or almond milk to coffee or tea.

## 2021-10-26 ENCOUNTER — TELEPHONE (OUTPATIENT)
Dept: OBSTETRICS AND GYNECOLOGY | Facility: CLINIC | Age: 26
End: 2021-10-26

## 2021-10-27 ENCOUNTER — ANESTHESIA EVENT (OUTPATIENT)
Dept: PERIOP | Facility: HOSPITAL | Age: 26
End: 2021-10-27

## 2021-10-27 ENCOUNTER — ANESTHESIA (OUTPATIENT)
Dept: PERIOP | Facility: HOSPITAL | Age: 26
End: 2021-10-27

## 2021-10-27 ENCOUNTER — HOSPITAL ENCOUNTER (OUTPATIENT)
Facility: HOSPITAL | Age: 26
Setting detail: HOSPITAL OUTPATIENT SURGERY
Discharge: HOME | End: 2021-10-27
Attending: OBSTETRICS & GYNECOLOGY | Admitting: OBSTETRICS & GYNECOLOGY

## 2021-10-27 VITALS
OXYGEN SATURATION: 100 % | HEART RATE: 76 BPM | HEIGHT: 66 IN | DIASTOLIC BLOOD PRESSURE: 75 MMHG | TEMPERATURE: 98 F | BODY MASS INDEX: 36.64 KG/M2 | RESPIRATION RATE: 16 BRPM | WEIGHT: 227.96 LBS | SYSTOLIC BLOOD PRESSURE: 118 MMHG

## 2021-10-27 DIAGNOSIS — D06.9 SEVERE DYSPLASIA OF CERVIX (CIN III): ICD-10-CM

## 2021-10-27 LAB
ABO GROUP BLD: NORMAL
BLD GP AB SCN SERPL QL: NEGATIVE
RH BLD: POSITIVE
T&S EXPIRATION DATE: NORMAL

## 2021-10-27 PROCEDURE — 86900 BLOOD TYPING SEROLOGIC ABO: CPT | Performed by: OBSTETRICS & GYNECOLOGY

## 2021-10-27 PROCEDURE — 25010000002 ONDANSETRON PER 1 MG: Performed by: NURSE ANESTHETIST, CERTIFIED REGISTERED

## 2021-10-27 PROCEDURE — 86901 BLOOD TYPING SEROLOGIC RH(D): CPT | Performed by: OBSTETRICS & GYNECOLOGY

## 2021-10-27 PROCEDURE — 25010000002 HYDROMORPHONE 1 MG/ML SOLUTION: Performed by: OBSTETRICS & GYNECOLOGY

## 2021-10-27 PROCEDURE — 25010000002 MIDAZOLAM PER 1MG: Performed by: ANESTHESIOLOGY

## 2021-10-27 PROCEDURE — 25010000002 HYDROMORPHONE PER 4 MG: Performed by: NURSE ANESTHETIST, CERTIFIED REGISTERED

## 2021-10-27 PROCEDURE — 88307 TISSUE EXAM BY PATHOLOGIST: CPT | Performed by: OBSTETRICS & GYNECOLOGY

## 2021-10-27 PROCEDURE — 25010000002 PROPOFOL 10 MG/ML EMULSION: Performed by: NURSE ANESTHETIST, CERTIFIED REGISTERED

## 2021-10-27 PROCEDURE — 25010000002 DEXAMETHASONE PER 1 MG: Performed by: NURSE ANESTHETIST, CERTIFIED REGISTERED

## 2021-10-27 PROCEDURE — 57505 ENDOCERVICAL CURETTAGE: CPT | Performed by: OBSTETRICS & GYNECOLOGY

## 2021-10-27 PROCEDURE — 88305 TISSUE EXAM BY PATHOLOGIST: CPT | Performed by: OBSTETRICS & GYNECOLOGY

## 2021-10-27 PROCEDURE — 86850 RBC ANTIBODY SCREEN: CPT | Performed by: OBSTETRICS & GYNECOLOGY

## 2021-10-27 PROCEDURE — 57461 CONZ OF CERVIX W/SCOPE LEEP: CPT | Performed by: OBSTETRICS & GYNECOLOGY

## 2021-10-27 RX ORDER — DEXMEDETOMIDINE HYDROCHLORIDE 100 UG/ML
INJECTION, SOLUTION INTRAVENOUS AS NEEDED
Status: DISCONTINUED | OUTPATIENT
Start: 2021-10-27 | End: 2021-10-27 | Stop reason: SURG

## 2021-10-27 RX ORDER — LUGOLS 0.4 G/G
LIQUID TOPICAL AS NEEDED
Status: DISCONTINUED | OUTPATIENT
Start: 2021-10-27 | End: 2021-10-27 | Stop reason: HOSPADM

## 2021-10-27 RX ORDER — HYDROMORPHONE HCL 110MG/55ML
PATIENT CONTROLLED ANALGESIA SYRINGE INTRAVENOUS AS NEEDED
Status: DISCONTINUED | OUTPATIENT
Start: 2021-10-27 | End: 2021-10-27 | Stop reason: SURG

## 2021-10-27 RX ORDER — ACETAMINOPHEN 325 MG/1
650 TABLET ORAL EVERY 6 HOURS PRN
Qty: 30 TABLET | Refills: 1 | Status: SHIPPED | OUTPATIENT
Start: 2021-10-27 | End: 2021-11-06

## 2021-10-27 RX ORDER — ACETAMINOPHEN 500 MG
1000 TABLET ORAL ONCE
Status: COMPLETED | OUTPATIENT
Start: 2021-10-27 | End: 2021-10-27

## 2021-10-27 RX ORDER — DEXAMETHASONE SODIUM PHOSPHATE 4 MG/ML
INJECTION, SOLUTION INTRA-ARTICULAR; INTRALESIONAL; INTRAMUSCULAR; INTRAVENOUS; SOFT TISSUE AS NEEDED
Status: DISCONTINUED | OUTPATIENT
Start: 2021-10-27 | End: 2021-10-27 | Stop reason: SURG

## 2021-10-27 RX ORDER — ONDANSETRON 2 MG/ML
INJECTION INTRAMUSCULAR; INTRAVENOUS AS NEEDED
Status: DISCONTINUED | OUTPATIENT
Start: 2021-10-27 | End: 2021-10-27 | Stop reason: SURG

## 2021-10-27 RX ORDER — HYDROCODONE BITARTRATE AND ACETAMINOPHEN 5; 325 MG/1; MG/1
1-2 TABLET ORAL EVERY 6 HOURS PRN
Qty: 10 TABLET | Refills: 0 | Status: SHIPPED | OUTPATIENT
Start: 2021-10-27 | End: 2021-11-06

## 2021-10-27 RX ORDER — SODIUM CHLORIDE, SODIUM LACTATE, POTASSIUM CHLORIDE, CALCIUM CHLORIDE 600; 310; 30; 20 MG/100ML; MG/100ML; MG/100ML; MG/100ML
9 INJECTION, SOLUTION INTRAVENOUS CONTINUOUS PRN
Status: DISCONTINUED | OUTPATIENT
Start: 2021-10-27 | End: 2021-10-27 | Stop reason: HOSPADM

## 2021-10-27 RX ORDER — PROMETHAZINE HYDROCHLORIDE 12.5 MG/1
25 TABLET ORAL ONCE AS NEEDED
Status: DISCONTINUED | OUTPATIENT
Start: 2021-10-27 | End: 2021-10-27 | Stop reason: HOSPADM

## 2021-10-27 RX ORDER — PROMETHAZINE HYDROCHLORIDE 25 MG/1
25 SUPPOSITORY RECTAL ONCE AS NEEDED
Status: DISCONTINUED | OUTPATIENT
Start: 2021-10-27 | End: 2021-10-27 | Stop reason: HOSPADM

## 2021-10-27 RX ORDER — IBUPROFEN 800 MG/1
800 TABLET ORAL EVERY 8 HOURS PRN
Qty: 30 TABLET | Refills: 1 | Status: SHIPPED | OUTPATIENT
Start: 2021-10-27 | End: 2021-11-06

## 2021-10-27 RX ORDER — LIDOCAINE HYDROCHLORIDE AND EPINEPHRINE 10; 10 MG/ML; UG/ML
INJECTION, SOLUTION INFILTRATION; PERINEURAL AS NEEDED
Status: DISCONTINUED | OUTPATIENT
Start: 2021-10-27 | End: 2021-10-27 | Stop reason: HOSPADM

## 2021-10-27 RX ORDER — SODIUM CHLORIDE 0.9 % (FLUSH) 0.9 %
10 SYRINGE (ML) INJECTION AS NEEDED
Status: DISCONTINUED | OUTPATIENT
Start: 2021-10-27 | End: 2021-10-27 | Stop reason: HOSPADM

## 2021-10-27 RX ORDER — MEPERIDINE HYDROCHLORIDE 25 MG/ML
12.5 INJECTION INTRAMUSCULAR; INTRAVENOUS; SUBCUTANEOUS
Status: DISCONTINUED | OUTPATIENT
Start: 2021-10-27 | End: 2021-10-27 | Stop reason: HOSPADM

## 2021-10-27 RX ORDER — SODIUM CHLORIDE, SODIUM LACTATE, POTASSIUM CHLORIDE, CALCIUM CHLORIDE 600; 310; 30; 20 MG/100ML; MG/100ML; MG/100ML; MG/100ML
150 INJECTION, SOLUTION INTRAVENOUS CONTINUOUS
Status: DISCONTINUED | OUTPATIENT
Start: 2021-10-27 | End: 2021-10-27 | Stop reason: HOSPADM

## 2021-10-27 RX ORDER — ONDANSETRON 2 MG/ML
4 INJECTION INTRAMUSCULAR; INTRAVENOUS ONCE AS NEEDED
Status: DISCONTINUED | OUTPATIENT
Start: 2021-10-27 | End: 2021-10-27 | Stop reason: HOSPADM

## 2021-10-27 RX ORDER — HYDROCODONE BITARTRATE AND ACETAMINOPHEN 5; 325 MG/1; MG/1
2 TABLET ORAL ONCE AS NEEDED
Status: DISCONTINUED | OUTPATIENT
Start: 2021-10-27 | End: 2021-10-27 | Stop reason: HOSPADM

## 2021-10-27 RX ORDER — SODIUM CHLORIDE 0.9 % (FLUSH) 0.9 %
3 SYRINGE (ML) INJECTION EVERY 12 HOURS SCHEDULED
Status: DISCONTINUED | OUTPATIENT
Start: 2021-10-27 | End: 2021-10-27 | Stop reason: HOSPADM

## 2021-10-27 RX ORDER — OXYCODONE HYDROCHLORIDE 5 MG/1
5 TABLET ORAL
Status: DISCONTINUED | OUTPATIENT
Start: 2021-10-27 | End: 2021-10-27 | Stop reason: HOSPADM

## 2021-10-27 RX ORDER — MIDAZOLAM HYDROCHLORIDE 2 MG/2ML
2 INJECTION, SOLUTION INTRAMUSCULAR; INTRAVENOUS ONCE
Status: COMPLETED | OUTPATIENT
Start: 2021-10-27 | End: 2021-10-27

## 2021-10-27 RX ORDER — ACETAMINOPHEN 325 MG/1
650 TABLET ORAL ONCE
Status: DISCONTINUED | OUTPATIENT
Start: 2021-10-27 | End: 2021-10-27 | Stop reason: HOSPADM

## 2021-10-27 RX ORDER — ONDANSETRON 2 MG/ML
4 INJECTION INTRAMUSCULAR; INTRAVENOUS EVERY 6 HOURS PRN
Status: DISCONTINUED | OUTPATIENT
Start: 2021-10-27 | End: 2021-10-27 | Stop reason: HOSPADM

## 2021-10-27 RX ORDER — LIDOCAINE HYDROCHLORIDE 20 MG/ML
INJECTION, SOLUTION INFILTRATION; PERINEURAL AS NEEDED
Status: DISCONTINUED | OUTPATIENT
Start: 2021-10-27 | End: 2021-10-27 | Stop reason: SURG

## 2021-10-27 RX ORDER — PROMETHAZINE HYDROCHLORIDE 12.5 MG/1
12.5 TABLET ORAL ONCE AS NEEDED
Status: DISCONTINUED | OUTPATIENT
Start: 2021-10-27 | End: 2021-10-27 | Stop reason: HOSPADM

## 2021-10-27 RX ADMIN — DEXMEDETOMIDINE HYDROCHLORIDE 15 MCG: 100 INJECTION, SOLUTION, CONCENTRATE INTRAVENOUS at 13:30

## 2021-10-27 RX ADMIN — HYDROMORPHONE HYDROCHLORIDE 0.5 MG: 1 INJECTION, SOLUTION INTRAMUSCULAR; INTRAVENOUS; SUBCUTANEOUS at 14:27

## 2021-10-27 RX ADMIN — ONDANSETRON 4 MG: 2 INJECTION INTRAMUSCULAR; INTRAVENOUS at 13:55

## 2021-10-27 RX ADMIN — MIDAZOLAM HYDROCHLORIDE 2 MG: 1 INJECTION, SOLUTION INTRAMUSCULAR; INTRAVENOUS at 12:29

## 2021-10-27 RX ADMIN — SODIUM CHLORIDE, POTASSIUM CHLORIDE, SODIUM LACTATE AND CALCIUM CHLORIDE 9 ML/HR: 600; 310; 30; 20 INJECTION, SOLUTION INTRAVENOUS at 12:30

## 2021-10-27 RX ADMIN — HYDROMORPHONE HYDROCHLORIDE 1 MG: 2 INJECTION, SOLUTION INTRAMUSCULAR; INTRAVENOUS; SUBCUTANEOUS at 14:00

## 2021-10-27 RX ADMIN — DEXAMETHASONE SODIUM PHOSPHATE 4 MG: 4 INJECTION INTRA-ARTICULAR; INTRALESIONAL; INTRAMUSCULAR; INTRAVENOUS; SOFT TISSUE at 13:55

## 2021-10-27 RX ADMIN — HYDROMORPHONE HYDROCHLORIDE 1 MG: 2 INJECTION, SOLUTION INTRAMUSCULAR; INTRAVENOUS; SUBCUTANEOUS at 13:37

## 2021-10-27 RX ADMIN — LIDOCAINE HYDROCHLORIDE 33 MG: 20 INJECTION, SOLUTION INFILTRATION; PERINEURAL at 13:30

## 2021-10-27 RX ADMIN — ACETAMINOPHEN 1000 MG: 500 TABLET ORAL at 09:55

## 2021-10-27 RX ADMIN — OXYCODONE HYDROCHLORIDE 5 MG: 5 TABLET ORAL at 14:28

## 2021-10-27 RX ADMIN — PROPOFOL 333 MCG/KG/MIN: 10 INJECTION, EMULSION INTRAVENOUS at 13:30

## 2021-10-27 RX ADMIN — SODIUM CHLORIDE, POTASSIUM CHLORIDE, SODIUM LACTATE AND CALCIUM CHLORIDE 9 ML/HR: 600; 310; 30; 20 INJECTION, SOLUTION INTRAVENOUS at 09:56

## 2021-10-27 NOTE — ANESTHESIA POSTPROCEDURE EVALUATION
Patient: Ashley Lobo    Procedure Summary     Date: 10/27/21 Room / Location: [facility] OR 04 / [facility] MAIN OR    Anesthesia Start: 1326 Anesthesia Stop: 1414    Procedures:       COLPOSCOPY (N/A Vagina)      LOOP ELECTROCAUTERY EXCISION PROCEDURE (N/A Cervix) Diagnosis:       Severe dysplasia of cervix (HERI III)      (Severe dysplasia of cervix (HERI III) [D06.9])    Surgeons: Irma Almazan DO Provider: Nate Wiggins MD    Anesthesia Type: general ASA Status: 2          Anesthesia Type: general    Vitals  Vitals Value Taken Time   /80 10/27/21 1437   Temp 36.7 °C (98 °F) 10/27/21 1407   Pulse 80 10/27/21 1440   Resp 14 10/27/21 1407   SpO2 97 % 10/27/21 1440   Vitals shown include unvalidated device data.        Post Anesthesia Care and Evaluation    Patient location during evaluation: bedside  Patient participation: complete - patient participated  Level of consciousness: awake  Pain management: adequate  Airway patency: patent  Anesthetic complications: No anesthetic complications  PONV Status: none  Cardiovascular status: acceptable and stable  Respiratory status: acceptable and room air  Hydration status: acceptable    Comments: An Anesthesiologist personally participated in the most demanding procedures (including induction and emergence if applicable) in the anesthesia plan, monitored the course of anesthesia administration at frequent intervals and remained physically present and available for immediate diagnosis and treatment of emergencies.

## 2021-10-27 NOTE — ANESTHESIA PREPROCEDURE EVALUATION
Anesthesia Evaluation     Patient summary reviewed and Nursing notes reviewed   no history of anesthetic complications:  NPO Solid Status: > 8 hours  NPO Liquid Status: > 2 hours           Airway   Mallampati: II  TM distance: >3 FB  Neck ROM: full  No difficulty expected  Dental      Pulmonary - negative pulmonary ROS and normal exam    breath sounds clear to auscultation  Cardiovascular - negative cardio ROS and normal exam  Exercise tolerance: good (4-7 METS)    Rhythm: regular        Neuro/Psych  (+) psychiatric history Anxiety,     GI/Hepatic/Renal/Endo - negative ROS     Musculoskeletal     Abdominal    Substance History - negative use     OB/GYN negative ob/gyn ROS         Other   arthritis,                      Anesthesia Plan    ASA 2     general   (Patient understands anesthesia not responsible for dental damage.)  intravenous induction     Anesthetic plan, all risks, benefits, and alternatives have been provided, discussed and informed consent has been obtained with: patient.    Plan discussed with CRNA.

## 2021-10-29 LAB
CYTO UR: NORMAL
LAB AP CASE REPORT: NORMAL
LAB AP CLINICAL INFORMATION: NORMAL
PATH REPORT.FINAL DX SPEC: NORMAL
PATH REPORT.GROSS SPEC: NORMAL

## 2021-11-02 ENCOUNTER — TELEPHONE (OUTPATIENT)
Dept: OBSTETRICS AND GYNECOLOGY | Facility: CLINIC | Age: 26
End: 2021-11-02

## 2021-11-02 NOTE — TELEPHONE ENCOUNTER
----- Message from Irma Almazan DO sent at 10/29/2021  6:05 PM EDT -----  Recommend pap and cotest at 12 and 24mo  Path looks like we got all the abnormal cells.  Keep FU w me as scheduled in 4 weeks.

## 2021-11-09 ENCOUNTER — OFFICE VISIT (OUTPATIENT)
Dept: OBSTETRICS AND GYNECOLOGY | Facility: CLINIC | Age: 26
End: 2021-11-09

## 2021-11-09 VITALS
DIASTOLIC BLOOD PRESSURE: 91 MMHG | BODY MASS INDEX: 38.09 KG/M2 | WEIGHT: 236 LBS | HEART RATE: 118 BPM | SYSTOLIC BLOOD PRESSURE: 134 MMHG

## 2021-11-09 DIAGNOSIS — N88.8 BLEEDING OF CERVIX: Primary | ICD-10-CM

## 2021-11-09 PROCEDURE — 99215 OFFICE O/P EST HI 40 MIN: CPT | Performed by: OBSTETRICS & GYNECOLOGY

## 2021-11-09 NOTE — PROGRESS NOTES
GYN Visit    Chief Complaint   Patient presents with   • Post-op     AUB Post Op       HPI:   26 y.o. Contraception or HRT: None    OR 27Oct.  2days ago started VB.  Last night and this am change in 30min.  LMP 16Nov, hx irreg.  Moving, packing, some lifting x1week. No sex or tampons.  No vag dc, no itch, no odor.     ROS: No pain.  No fever chills.    History: PMHx, Meds, Allergies, PSHx, Social Hx, and POBHx all reviewed and updated.    PHYSICAL EXAM:  BP (!) 171/115   Pulse 118   Wt 107 kg (236 lb)   LMP 10/16/2021   BMI 38.09 kg/m²   General- NAD, alert and oriented, appropriate  Psych- Normal mood, good memory  Abdomen- Soft, non distended, non tender, no masses  External genitalia- Normal female, no lesions  Urethra/meatus- Normal, no masses, non tender, no prolapse  Bladder- Normal, no masses, non tender, no prolapse  Vagina- Normal, no atrophy, no lesions, no discharge, no prolapse  Cvx- SMALL BLEEDING VESSEL AT 0900, HEMOSTATIC W PRESSURE CLAMP RING FORCEPS AND MONSELS.  Packed for 30min.  No further bleeding.  Monitored for another 30min after pack removed, NO bleeding.  Uterus- Normal size, shape & consistency.  Non tender, mobile, & no prolapse  Adnexa- No mass, non tender  Lymphatic- No groin nodes  Ext- No edema, no cyanosis    Skin- No lesions, no rashes, no acanthosis nigricans      ASSESSMENT AND PLAN:  Diagnoses and all orders for this visit:    1. Bleeding of cervix (Primary) s/p LEEP- now hemostatic    • Bleeding, pain, infection precautions.  No lifting, pushing, pulling.  No tub bath or pool.    Follow Up:  Return if symptoms worsen or fail to improve and keep PO OV.  Off work today and Wed/Thu..    I spent 50 minutes caring for Ashley on this date of service. This time includes time spent by me in the following activities:preparing for the visit, reviewing tests, obtaining and/or reviewing a separately obtained history, performing a medically appropriate examination and/or  evaluation , counseling and educating the patient/family/caregiver and documenting information in the medical record      Irma Almazan DO  11/09/2021    [facility]  [facility] [place] [address]  Dept: [phone]  Dept Fax: [phone]  Loc: [phone]  Loc Fax: [phone]

## 2021-11-12 ENCOUNTER — TELEPHONE (OUTPATIENT)
Dept: OBSTETRICS AND GYNECOLOGY | Facility: CLINIC | Age: 26
End: 2021-11-12

## 2021-11-12 NOTE — TELEPHONE ENCOUNTER
"Patient called stating she was seen Tuesday for cervical bleeding that she said had to be \"stitched\". She was told to call if the bleeding started again and that she may have to go to the hospital to have \"something\" done.She has started to have bleeding again. At this time it is just with wiping but she states this is how it started out last time. Patient is requesting recommendations. Please advise.  "

## 2021-11-29 ENCOUNTER — OFFICE VISIT (OUTPATIENT)
Dept: OBSTETRICS AND GYNECOLOGY | Facility: CLINIC | Age: 26
End: 2021-11-29

## 2021-11-29 VITALS
DIASTOLIC BLOOD PRESSURE: 95 MMHG | HEART RATE: 102 BPM | BODY MASS INDEX: 37.77 KG/M2 | WEIGHT: 234 LBS | SYSTOLIC BLOOD PRESSURE: 145 MMHG

## 2021-11-29 DIAGNOSIS — R03.0 ELEVATED BP WITHOUT DIAGNOSIS OF HYPERTENSION: ICD-10-CM

## 2021-11-29 DIAGNOSIS — D06.9 SEVERE DYSPLASIA OF CERVIX (CIN III): Primary | ICD-10-CM

## 2021-11-29 PROCEDURE — 99213 OFFICE O/P EST LOW 20 MIN: CPT | Performed by: OBSTETRICS & GYNECOLOGY

## 2021-11-29 NOTE — PROGRESS NOTES
Post Operative Visit      CC: Post operative follow up  Chief Complaint   Patient presents with   • Post-op     LEEP       HPI:   Pain:  No  Vaginal bleeding:  No  Vaginal discharge:  No  Fever/chills:  No  Good appetite:  Yes  Normal bladder function:  Yes  Normal bowel function:  Yes  Hot flashes: No    Operative report, surgical findings and any pathology reviewed.    PHYSICAL EXAM:  /95   Pulse 102   Wt 106 kg (234 lb)   BMI 37.77 kg/m²   General- NAD, alert and oriented, appropriate  Psych- Normal mood, good memory    External genitalia- Normal, no lesions  Urethra- Normal, no masses, non tender  Vagina- NL no atrophy, no discharge, no prolapse  Bladder- Normal, no masses, non tender, no prolapse  Cvx- LEEP bed healing well, Normal, no lesions, no discharge, No cervical motion tenderness  Uterus- not examined  Adnexa- not examine    Lymphatic- No palpable groin nodes  Ext- No edema, no cyanosis   Skin- No lesions, no rashes, no acanthosis nigricans    Tissue Pathology Exam (10/27/2021 13:54)     ASSESSMENT and PLAN:  Post-operative exam    Diagnoses and all orders for this visit:    1. Severe dysplasia of cervix (HERI III) (Primary)  Comments:  neg margins    Overview:  Surgery to be scheduled in next 2-3mo (COVID restrictions per [facility]).      2. Elevated BP without diagnosis of hypertension-   Importance FU w PCP, pt declines assistance to schedule, she will call PCP locally.  Pt agrees to FU and understands implications of persistent elev BP.     Any available photos and/or pathology were reviewed.  All questions answered.     Counseling:   • Ok to resume normal activities  • Ok to resume intercourse  • Return to school/work without limitations  • PAP smear frequency at 12 and 24mo w cotest     Follow Up:  Return in about 1 year (around 11/29/2022) for WWE w pap and cotest HPV.            Irma Almazan, DO  11/29/2021    [facility]  [address] [place] [address]  Dept: [phone]  Dept Fax: [phone]  Loc: [phone]  Loc Fax: [phone]

## 2022-04-15 ENCOUNTER — OFFICE VISIT (OUTPATIENT)
Dept: OBSTETRICS AND GYNECOLOGY | Facility: CLINIC | Age: 27
End: 2022-04-15

## 2022-04-15 VITALS
DIASTOLIC BLOOD PRESSURE: 91 MMHG | HEART RATE: 71 BPM | SYSTOLIC BLOOD PRESSURE: 133 MMHG | HEIGHT: 66 IN | WEIGHT: 222 LBS | BODY MASS INDEX: 35.68 KG/M2

## 2022-04-15 DIAGNOSIS — R03.0 ELEVATED BLOOD PRESSURE READING: ICD-10-CM

## 2022-04-15 DIAGNOSIS — N91.4 SECONDARY OLIGOMENORRHEA: ICD-10-CM

## 2022-04-15 DIAGNOSIS — R10.2 PELVIC PAIN: ICD-10-CM

## 2022-04-15 DIAGNOSIS — Z11.3 SCREEN FOR STD (SEXUALLY TRANSMITTED DISEASE): Primary | ICD-10-CM

## 2022-04-15 DIAGNOSIS — N76.0 ACUTE VAGINITIS: ICD-10-CM

## 2022-04-15 LAB
C TRACH RRNA CVX QL NAA+PROBE: NOT DETECTED
CANDIDA SPECIES: NEGATIVE
GARDNERELLA VAGINALIS: NEGATIVE
N GONORRHOEA RRNA SPEC QL NAA+PROBE: NOT DETECTED
T VAGINALIS DNA VAG QL PROBE+SIG AMP: NEGATIVE

## 2022-04-15 PROCEDURE — 87480 CANDIDA DNA DIR PROBE: CPT | Performed by: NURSE PRACTITIONER

## 2022-04-15 PROCEDURE — 87491 CHLMYD TRACH DNA AMP PROBE: CPT | Performed by: NURSE PRACTITIONER

## 2022-04-15 PROCEDURE — 87660 TRICHOMONAS VAGIN DIR PROBE: CPT | Performed by: NURSE PRACTITIONER

## 2022-04-15 PROCEDURE — 87510 GARDNER VAG DNA DIR PROBE: CPT | Performed by: NURSE PRACTITIONER

## 2022-04-15 PROCEDURE — 87591 N.GONORRHOEAE DNA AMP PROB: CPT | Performed by: NURSE PRACTITIONER

## 2022-04-15 PROCEDURE — 99214 OFFICE O/P EST MOD 30 MIN: CPT | Performed by: NURSE PRACTITIONER

## 2022-04-15 RX ORDER — MULTIPLE VITAMINS W/ MINERALS TAB 9MG-400MCG
1 TAB ORAL DAILY
COMMUNITY

## 2022-04-15 RX ORDER — DULOXETIN HYDROCHLORIDE 30 MG/1
30 CAPSULE, DELAYED RELEASE ORAL DAILY
COMMUNITY
Start: 2022-01-28 | End: 2023-01-28

## 2022-04-15 RX ORDER — ERGOCALCIFEROL (VITAMIN D2) 10 MCG
400 TABLET ORAL DAILY
COMMUNITY

## 2022-04-15 RX ORDER — GLUCOSAMINE/D3/BOSWELLIA SERRA 1500MG-400
TABLET ORAL
COMMUNITY

## 2022-04-15 RX ORDER — ACETAMINOPHEN 325 MG/1
650 TABLET ORAL
COMMUNITY
End: 2022-11-08

## 2022-04-15 RX ORDER — DICLOFENAC SODIUM 75 MG/1
75 TABLET, DELAYED RELEASE ORAL 2 TIMES DAILY PRN
COMMUNITY
Start: 2022-01-28 | End: 2022-11-08

## 2022-04-15 RX ORDER — PROPRANOLOL HYDROCHLORIDE 10 MG/1
10 TABLET ORAL
COMMUNITY
Start: 2021-12-03 | End: 2022-06-01

## 2022-04-15 NOTE — PROGRESS NOTES
"GYN Problem/Follow Up Visit    Chief Complaint   Patient presents with   • STD Testing     Talk about PCOS           HPI  Ashley Lobo is a 26 y.o. female, , who presents for std screen, reports 1 month dark discharge with odor, desires std screen    Menses every 3-6  Months, last 5-14 days, on heavy days change pad ever 4 hours, moderate menstrual cramps    Random left and right pelvic pain, \"ovaries feel swollen\" for the past 2 year, bending forward increases    Additional OB/GYN History   No LMP recorded.  Current contraception: contraceptive methods: None  Allergies : Patient has no known allergies.     The additional following portions of the patient's history were reviewed and updated as appropriate: allergies, current medications, past family history, past medical history, past social history, past surgical history and problem list.    Review of Systems    I have reviewed and agree with the HPI, ROS, and historical information as entered above. Kelly Alves, APRN    Objective   /91 (Cuff Size: Large Adult)   Pulse 71   Ht 167.6 cm (66\")   Wt 101 kg (222 lb)   Breastfeeding No   BMI 35.83 kg/m²     Physical Exam  Vitals and nursing note reviewed. Exam conducted with a chaperone present.   Constitutional:       Appearance: Normal appearance.   Neck:      Thyroid: No thyromegaly.   Cardiovascular:      Rate and Rhythm: Normal rate and regular rhythm.      Heart sounds: Normal heart sounds.   Pulmonary:      Effort: Pulmonary effort is normal.      Breath sounds: Normal breath sounds.   Abdominal:      General: There is no distension.      Palpations: Abdomen is soft.      Tenderness: There is no right CVA tenderness or left CVA tenderness.   Genitourinary:     General: Normal vulva.      Vagina: Normal.      Cervix: Normal.      Uterus: Normal.       Adnexa: Right adnexa normal and left adnexa normal.   Lymphadenopathy:      Lower Body: No right inguinal adenopathy. No left inguinal " adenopathy.   Skin:     General: Skin is warm and dry.   Neurological:      Mental Status: She is alert and oriented to person, place, and time.          Assessment and Plan    Diagnoses and all orders for this visit:    1. Screen for STD (sexually transmitted disease) (Primary)  -     Chlamydia trachomatis, Neisseria gonorrhoeae, PCR - Swab, Cervix    2. Elevated blood pressure reading    3. Secondary oligomenorrhea  -     Prolactin; Future  -     TSH; Future  -     CBC (No Diff); Future  -     DHEA-Sulfate; Future  -     Insulin, Total; Future  -     Glucose, Fasting; Future  -     Testosterone; Future  -     17-Hydroxyprogesterone; Future    4. Pelvic pain  -     US Non-ob Transvaginal; Future    5. Acute vaginitis  -     Gardnerella vaginalis, Trichomonas vaginalis, Candida albicans, DNA - Swab, Vagina      Counseling:  • All BC Options R/B/A/SE/E of each  • PCOS-NLT Dx, Tx, weight, pregnancy, periods/anovulation, uterine cancer risk   • PCP for follow up on bp      She understands the importance of having the above orders performed in a timely fashion.  The risks of not performing them include, but are not limited to, cancer and/or subsequent increase in morbidity and/or mortality.  She is encouraged to review her results online and/or contact or office if she has questions.     Follow Up:  Return for after ultrasound.        SULMA Sierra  04/15/2022

## 2022-05-17 ENCOUNTER — LAB (OUTPATIENT)
Dept: OBSTETRICS AND GYNECOLOGY | Facility: CLINIC | Age: 27
End: 2022-05-17

## 2022-05-17 ENCOUNTER — HOSPITAL ENCOUNTER (OUTPATIENT)
Dept: ULTRASOUND IMAGING | Facility: HOSPITAL | Age: 27
Discharge: HOME | End: 2022-05-17
Admitting: NURSE PRACTITIONER

## 2022-05-17 DIAGNOSIS — R10.2 PELVIC PAIN: ICD-10-CM

## 2022-05-17 DIAGNOSIS — N91.4 SECONDARY OLIGOMENORRHEA: ICD-10-CM

## 2022-05-17 LAB
DEPRECATED RDW RBC AUTO: 43.2 FL (ref 37–54)
ERYTHROCYTE [DISTWIDTH] IN BLOOD BY AUTOMATED COUNT: 13 % (ref 12.3–15.4)
GLUCOSE P FAST SERPL-MCNC: 127 MG/DL (ref 74–106)
HCT VFR BLD AUTO: 44.5 % (ref 34–46.6)
HGB BLD-MCNC: 14.5 G/DL (ref 12–15.9)
MCH RBC QN AUTO: 29.6 PG (ref 26.6–33)
MCHC RBC AUTO-ENTMCNC: 32.6 G/DL (ref 31.5–35.7)
MCV RBC AUTO: 90.8 FL (ref 79–97)
PLATELET # BLD AUTO: 330 10*3/MM3 (ref 140–450)
PMV BLD AUTO: 10.1 FL (ref 6–12)
PROLACTIN SERPL-MCNC: 17.4 NG/ML (ref 4.79–23.3)
RBC # BLD AUTO: 4.9 10*6/MM3 (ref 3.77–5.28)
TESTOST SERPL-MCNC: 44.8 NG/DL (ref 8.4–48.1)
TSH SERPL DL<=0.05 MIU/L-ACNC: 1.86 UIU/ML (ref 0.27–4.2)
WBC NRBC COR # BLD: 7.42 10*3/MM3 (ref 3.4–10.8)

## 2022-05-17 PROCEDURE — 84403 ASSAY OF TOTAL TESTOSTERONE: CPT | Performed by: NURSE PRACTITIONER

## 2022-05-17 PROCEDURE — 82947 ASSAY GLUCOSE BLOOD QUANT: CPT | Performed by: NURSE PRACTITIONER

## 2022-05-17 PROCEDURE — 84443 ASSAY THYROID STIM HORMONE: CPT | Performed by: NURSE PRACTITIONER

## 2022-05-17 PROCEDURE — 84146 ASSAY OF PROLACTIN: CPT | Performed by: NURSE PRACTITIONER

## 2022-05-17 PROCEDURE — 76830 TRANSVAGINAL US NON-OB: CPT

## 2022-05-17 PROCEDURE — 83525 ASSAY OF INSULIN: CPT | Performed by: NURSE PRACTITIONER

## 2022-05-17 PROCEDURE — 82627 DEHYDROEPIANDROSTERONE: CPT | Performed by: NURSE PRACTITIONER

## 2022-05-17 PROCEDURE — 85027 COMPLETE CBC AUTOMATED: CPT | Performed by: NURSE PRACTITIONER

## 2022-05-17 PROCEDURE — 83498 ASY HYDROXYPROGESTERONE 17-D: CPT | Performed by: NURSE PRACTITIONER

## 2022-05-18 LAB
DHEA-S SERPL-MCNC: 250 UG/DL (ref 84.8–378)
INSULIN SERPL-ACNC: 21.1 UIU/ML (ref 2.6–24.9)

## 2022-05-20 ENCOUNTER — OFFICE VISIT (OUTPATIENT)
Dept: OBSTETRICS AND GYNECOLOGY | Facility: CLINIC | Age: 27
End: 2022-05-20

## 2022-05-20 ENCOUNTER — TELEPHONE (OUTPATIENT)
Dept: OBSTETRICS AND GYNECOLOGY | Facility: CLINIC | Age: 27
End: 2022-05-20

## 2022-05-20 VITALS
HEART RATE: 73 BPM | SYSTOLIC BLOOD PRESSURE: 145 MMHG | WEIGHT: 222 LBS | HEIGHT: 66 IN | BODY MASS INDEX: 35.68 KG/M2 | DIASTOLIC BLOOD PRESSURE: 91 MMHG

## 2022-05-20 DIAGNOSIS — N91.4 SECONDARY OLIGOMENORRHEA: Primary | ICD-10-CM

## 2022-05-20 DIAGNOSIS — L68.0 FEMALE HIRSUTISM: ICD-10-CM

## 2022-05-20 DIAGNOSIS — R03.0 ELEVATED BLOOD PRESSURE READING: ICD-10-CM

## 2022-05-20 DIAGNOSIS — R73.01 IMPAIRED FASTING GLUCOSE: ICD-10-CM

## 2022-05-20 PROCEDURE — 99214 OFFICE O/P EST MOD 30 MIN: CPT | Performed by: NURSE PRACTITIONER

## 2022-05-20 RX ORDER — IBUPROFEN 800 MG/1
800 TABLET ORAL EVERY 6 HOURS PRN
COMMUNITY

## 2022-05-20 RX ORDER — ACETAMINOPHEN AND CODEINE PHOSPHATE 120; 12 MG/5ML; MG/5ML
1 SOLUTION ORAL DAILY
Qty: 28 TABLET | Refills: 12 | Status: SHIPPED | OUTPATIENT
Start: 2022-05-20 | End: 2022-10-24 | Stop reason: SDUPTHER

## 2022-05-20 NOTE — PROGRESS NOTES
"GYN Problem/Follow Up Visit    Chief Complaint   Patient presents with   • F/U US Pelvic Pain           HPI  Ashley Lobo is a 26 y.o. female, , who presents for follow up oligomenorrhea, menorrhagia  Labwork reveals impaired fasting glucose, strong family history of diabetes    Pelvic ultrasound reveals mildly enlarged ovaries containing multiple peripheral follicles nonspecific for PCOS    History of elevated blood pressures, taking Inderal    Complaints of excessive hairgrowth, everywhere    Additional OB/GYN History   No LMP recorded (lmp unknown).  Current contraception: contraceptive methods: None  Allergies : Patient has no known allergies.     The additional following portions of the patient's history were reviewed and updated as appropriate: allergies, current medications, past family history, past medical history, past social history, past surgical history and problem list.    Review of Systems    I have reviewed and agree with the HPI, ROS, and historical information as entered above. Kelly Alves, APRN    Objective   /91   Pulse 73   Ht 167.6 cm (66\")   Wt 101 kg (222 lb)   LMP  (LMP Unknown) Comment: Pt states she had a cylcle this month, but unsure when  Breastfeeding No   BMI 35.83 kg/m²     Physical Exam  Vitals and nursing note reviewed.   Constitutional:       Appearance: Normal appearance. She is well-developed and well-groomed.   Neck:      Thyroid: No thyroid mass or thyromegaly.   Cardiovascular:      Rate and Rhythm: Normal rate.   Pulmonary:      Effort: Pulmonary effort is normal.   Skin:     General: Skin is warm and dry.   Neurological:      Mental Status: She is alert and oriented to person, place, and time.   Psychiatric:         Mood and Affect: Affect normal.         Cognition and Memory: Cognition normal.            Assessment and Plan    Diagnoses and all orders for this visit:    1. Secondary oligomenorrhea (Primary)  -     norethindrone (MICRONOR) 0.35 MG " tablet; Take 1 tablet by mouth Daily.  Dispense: 28 tablet; Refill: 12    2. Impaired fasting glucose  -     metFORMIN (GLUCOPHAGE) 500 MG tablet; Take 1 tablet by mouth 2 (Two) Times a Day With Meals.  Dispense: 180 tablet; Refill: 3    3. Elevated blood pressure reading    4. Female hirsutism  -     Ambulatory Referral to Dermatology        Counseling:  • All BC Options R/B/A/SE/E of each  • PCOS-NLT Dx, Tx, weight, pregnancy, periods/anovulation, uterine cancer risk   • PCP for management of elevated blood pressure      She understands the importance of having the above orders performed in a timely fashion.  The risks of not performing them include, but are not limited to, cancer and/or subsequent increase in morbidity and/or mortality.  She is encouraged to review her results online and/or contact or office if she has questions.     Follow Up:  Return in about 3 months (around 8/20/2022).        SULMA Sierra  05/20/2022

## 2022-05-22 LAB — 17OHP SERPL-MCNC: 72 NG/DL

## 2022-10-18 ENCOUNTER — TELEPHONE (OUTPATIENT)
Dept: OBSTETRICS AND GYNECOLOGY | Facility: CLINIC | Age: 27
End: 2022-10-18

## 2022-10-18 NOTE — TELEPHONE ENCOUNTER
Caller: Ashley Lobo    Relationship to patient: Self    Best call back number: [phone]    Patient is needing: PT IS REQUESTING TO HAVE HER SECOND DOSE OF HPV SHOT AT HER NEXT APPT ON 10/24/22.PT CAN BE REACHED ANYTIME, OK TO LVM.

## 2022-10-18 NOTE — TELEPHONE ENCOUNTER
Spoke to the patient. She states she received the first injection with her PCP office but wasn't sure of the date. She is going to find out that information. She was advised to discuss continuing the Gardasil injections with our office at her 10/24 appointment,

## 2022-10-24 ENCOUNTER — OFFICE VISIT (OUTPATIENT)
Dept: OBSTETRICS AND GYNECOLOGY | Facility: CLINIC | Age: 27
End: 2022-10-24

## 2022-10-24 VITALS
SYSTOLIC BLOOD PRESSURE: 112 MMHG | HEIGHT: 66 IN | WEIGHT: 210.6 LBS | BODY MASS INDEX: 33.85 KG/M2 | DIASTOLIC BLOOD PRESSURE: 78 MMHG | HEART RATE: 79 BPM

## 2022-10-24 DIAGNOSIS — Z23 NEED FOR VACCINATION: ICD-10-CM

## 2022-10-24 DIAGNOSIS — R73.01 IMPAIRED FASTING GLUCOSE: ICD-10-CM

## 2022-10-24 DIAGNOSIS — N91.4 SECONDARY OLIGOMENORRHEA: Primary | ICD-10-CM

## 2022-10-24 PROCEDURE — 90651 9VHPV VACCINE 2/3 DOSE IM: CPT | Performed by: NURSE PRACTITIONER

## 2022-10-24 PROCEDURE — 90471 IMMUNIZATION ADMIN: CPT | Performed by: NURSE PRACTITIONER

## 2022-10-24 PROCEDURE — 99213 OFFICE O/P EST LOW 20 MIN: CPT | Performed by: NURSE PRACTITIONER

## 2022-10-24 RX ORDER — OFLOXACIN 3 MG/ML
SOLUTION/ DROPS OPHTHALMIC
COMMUNITY
Start: 2022-08-16

## 2022-10-24 RX ORDER — ACETAMINOPHEN AND CODEINE PHOSPHATE 120; 12 MG/5ML; MG/5ML
1 SOLUTION ORAL DAILY
Qty: 84 TABLET | Refills: 3 | Status: SHIPPED | OUTPATIENT
Start: 2022-10-24 | End: 2022-11-08 | Stop reason: SDUPTHER

## 2022-10-24 RX ORDER — NETARSUDIL 0.2 MG/ML
SOLUTION/ DROPS OPHTHALMIC; TOPICAL
COMMUNITY
Start: 2022-10-10

## 2022-10-24 RX ORDER — BRINZOLAMIDE/BRIMONIDINE TARTRATE 10; 2 MG/ML; MG/ML
SUSPENSION/ DROPS OPHTHALMIC
COMMUNITY
Start: 2022-09-07

## 2022-10-24 RX ORDER — BRIMONIDINE TARTRATE AND TIMOLOL MALEATE 2; 5 MG/ML; MG/ML
SOLUTION OPHTHALMIC
COMMUNITY
Start: 2022-09-02

## 2022-10-24 RX ORDER — ACETAMINOPHEN AND CODEINE PHOSPHATE 120; 12 MG/5ML; MG/5ML
0.35 SOLUTION ORAL DAILY
Qty: 28 TABLET | Refills: 12 | COMMUNITY
Start: 2022-05-20 | End: 2022-10-24 | Stop reason: SDUPTHER

## 2022-10-24 NOTE — PROGRESS NOTES
"GYN Problem/Follow Up Visit    Chief Complaint   Patient presents with   • Follow-up     FU MEDS           HPI  Ashley Lobo is a 27 y.o. female, , who presents for follow up after starting metformin for evidence of impaired fasting glucose, hgba1c 5.9, strong family history of diabetes, 12 pound weightloss, taking metformin once daily, prescribed bid, denies intolerance    Hx of infrequent Menses lasting up to 14 days when occur, started on micronor 5 months ago, menses have regulated, monthly, lasting 5 days, on heavy day change products every 2-3 hours, minimal menstrual cramps.  Desires to continue current management.     Additional OB/GYN History   Patient's last menstrual period was 10/17/2022 (exact date).  Allergies : Patient has no known allergies.     The additional following portions of the patient's history were reviewed and updated as appropriate: allergies, current medications, past family history, past medical history, past social history, past surgical history and problem list.    Review of Systems    I have reviewed and agree with the HPI, ROS, and historical information as entered above. Kelly Alves, APRN    Objective   /78   Pulse 79   Ht 167.6 cm (66\")   Wt 95.5 kg (210 lb 9.6 oz)   LMP 10/17/2022 (Exact Date)   BMI 33.99 kg/m²     Physical Exam  Vitals and nursing note reviewed.   Constitutional:       Appearance: Normal appearance. She is well-developed and well-groomed.   Cardiovascular:      Rate and Rhythm: Normal rate.   Pulmonary:      Effort: Pulmonary effort is normal.   Lymphadenopathy:      Cervical: No cervical adenopathy.   Skin:     General: Skin is warm and dry.      Comments: Acanthosis nigricans neck, axilla     Neurological:      Mental Status: She is alert and oriented to person, place, and time.   Psychiatric:         Mood and Affect: Affect normal.         Cognition and Memory: Cognition normal.            Assessment and Plan    Diagnoses and all " orders for this visit:    1. Secondary oligomenorrhea (Primary)  -     norethindrone (MICRONOR) 0.35 MG tablet; Take 1 tablet by mouth Daily.  Dispense: 84 tablet; Refill: 3    2. Impaired fasting glucose  -     metFORMIN (GLUCOPHAGE) 500 MG tablet; Take 1 tablet by mouth 2 (Two) Times a Day With Meals.  Dispense: 180 tablet; Refill: 3    3. Need for vaccination  -     Discontinue: HPV 9-Valent Recomb Vaccine suspension 0.5 mL  -     HPV Vaccine (HPV9)    f  Counseling:  • TRACK MENSES, RTO if <q21d, >7d long, heavy or painful.    • All BIRTH CONTROL options R/B/A/SE/E of each reviewed in detail.  • SAFE SEX/condoms importance reviewed.    • PCOS- Dx, Tx, weight, pregnancy, periods/anovulation, cholesterol, insulin, and uterine cancer risk discussed w pt.   • PCP follow up for continued management Impaired fasting glucose        She understands the importance of having the above orders performed in a timely fashion.  The risks of not performing them include, but are not limited to, cancer and/or subsequent increase in morbidity and/or mortality.  She is encouraged to review her results online and/or contact or office if she has questions.     Follow Up:  Return in about 1 year (around 10/24/2023).        Kelly Alves, SULMA  10/24/2022

## 2022-11-08 ENCOUNTER — OFFICE VISIT (OUTPATIENT)
Dept: OBSTETRICS AND GYNECOLOGY | Facility: CLINIC | Age: 27
End: 2022-11-08

## 2022-11-08 VITALS
SYSTOLIC BLOOD PRESSURE: 113 MMHG | HEART RATE: 93 BPM | DIASTOLIC BLOOD PRESSURE: 75 MMHG | HEIGHT: 66 IN | WEIGHT: 215.8 LBS | BODY MASS INDEX: 34.68 KG/M2

## 2022-11-08 DIAGNOSIS — Z01.419 WELL WOMAN EXAM: Primary | ICD-10-CM

## 2022-11-08 DIAGNOSIS — N91.4 SECONDARY OLIGOMENORRHEA: ICD-10-CM

## 2022-11-08 PROCEDURE — 87624 HPV HI-RISK TYP POOLED RSLT: CPT | Performed by: NURSE PRACTITIONER

## 2022-11-08 PROCEDURE — G0123 SCREEN CERV/VAG THIN LAYER: HCPCS | Performed by: NURSE PRACTITIONER

## 2022-11-08 PROCEDURE — 99395 PREV VISIT EST AGE 18-39: CPT | Performed by: NURSE PRACTITIONER

## 2022-11-08 RX ORDER — PREDNISOLONE ACETATE 10 MG/ML
SUSPENSION/ DROPS OPHTHALMIC
COMMUNITY
Start: 2022-08-16

## 2022-11-08 RX ORDER — ACETAMINOPHEN AND CODEINE PHOSPHATE 120; 12 MG/5ML; MG/5ML
1 SOLUTION ORAL DAILY
Qty: 84 TABLET | Refills: 3 | Status: SHIPPED | OUTPATIENT
Start: 2022-11-08 | End: 2023-11-08

## 2022-11-08 NOTE — PROGRESS NOTES
HPI:   27 y.o. . Presents for well woman exam. Contraception or HRT: Contraception:  Birth control pill  Menses:   q 28 days, lasts 5 days, changes products q 2hrs on heaviest days.   Pain:  None  Last pap abnormal, LEEP 10-27-21 HGSIL HERI 3, margins negative  Complaints: Pt has no complaints today.    Past Medical History:   Diagnosis Date   • Abnormal Pap smear of cervix    • Anxiety    • Arthritis    • Bipolar 1 disorder (HCC) 2018   • Cataract    • Chlamydia    • cold sores    • Depression    • HPV (human papilloma virus) infection    • Left breast mass     X2, s/p biopsy, benign   • Scoliosis    • Seasonal allergies    • Severe dysplasia of cervix (HERI III) 2021   • Urogenital trichomoniasis       Past Surgical History:   Procedure Laterality Date   • BACK SURGERY      TAVAREZ RODS FOR SCOLIOSIS   • BREAST BIOPSY Left 2017    Procedure: LEFT BREAST BIOPSY WITH ULTRASOUND NEEDLE LOCALIZATION AND EXCISIONAL BIOPSY OF SECOND MASS IN LEFT BREAST;  Surgeon: Duc Toribio MD;  Location: [facility];  Service:    • COLPOSCOPY N/A 10/27/2021    Procedure: COLPOSCOPY;  Surgeon: Irma Almazan DO;  Location: [facility];  Service: Obstetrics/Gynecology;  Laterality: N/A;   • LEEP N/A 10/27/2021    HGSIL CIN3, margins clear: Procedure: LOOP ELECTROCAUTERY EXCISION PROCEDURE;  Surgeon: Irma Almazan DO;  Location: [facility];  Service: Obstetrics/Gynecology;  Laterality: N/A;      Family History   Problem Relation Age of Onset   • Hyperlipidemia Father    • Diabetes Mother    • Hyperlipidemia Mother    • Hypertension Mother    • Alcohol abuse Paternal Grandfather    • Birth defects Paternal Grandfather    • Heart attack Paternal Grandfather    • Hyperlipidemia Paternal Grandfather    • Colon cancer Maternal Grandmother    • COPD Maternal Grandmother         great grandmother   • Depression Maternal Grandmother    • Hearing loss Maternal Grandmother    • Heart attack Maternal  "Grandfather    • Hyperlipidemia Maternal Grandfather    • Cervical cancer Paternal Cousin    • Breast cancer Neg Hx    • Uterine cancer Neg Hx      Allergies as of 11/08/2022   • (No Known Allergies)        PCP: does manage PMHx and preventative labs    /75   Pulse 93   Ht 167.6 cm (66\")   Wt 97.9 kg (215 lb 12.8 oz)   LMP 10/17/2022 (Exact Date)   BMI 34.83 kg/m²     PHYSICAL EXAM: Chaperone present   General- NAD, alert and oriented, appropriate  Psych- Normal mood, good memory  Neck- No masses, no thyroid enlargement  Lymphatic- No palpable neck, axillary, or groin nodes  CV- Regular rhythm, no murmurs  Resp- CTA to bases, no wheezes  Abdomen- Soft, non distended, non tender, no masses  Breast left-  Bilaterally symmetrical, no masses, non tender, no nipple discharge  Breast right- Bilaterally symmetrical, no masses, non tender, no nipple discharge  External genitalia- Normal female, no lesions  Urethra/meatus- Normal, no masses, non tender, no prolapse  Bladder- Normal, no masses, non tender, no prolapse  Vagina- Normal, no atrophy, no lesions, no discharge, no prolapse  Cvx- Normal, no lesions, no discharge, No cervical motion tenderness  Uterus- Normal size, shape & consistency.  Non tender, mobile, & no prolapse  Adnexa- No mass, non tender  Anus/Rectum/Perineum- Not performed  Ext- No edema, no cyanosis    Skin- No lesions, no rashes, no acanthosis nigricans      ASSESSMENT and PLAN:    Diagnoses and all orders for this visit:    1. Well woman exam (Primary)  -     IgP, Aptima HPV    2. Secondary oligomenorrhea  -     norethindrone (MICRONOR) 0.35 MG tablet; Take 1 tablet by mouth Daily.  Dispense: 84 tablet; Refill: 3      Preventative:   BREAST HEALTH- Monthly self breast exam importance and how to reviewed. MMG and/or MRI (prn) reviewed per society guidelines and her individual history. Screen: Updated today  CERVICAL CANCER Screening- Reviewed current ASCCP guidelines for screening w and wo " cotest HPV, age specific.  Screen: Updated today  COLON CANCER Screening- Reviewed current medical society guidelines and options.  Screen:  Not medically needed  SEXUAL HEALTH: Declines STD screening,  Safe sex and condoms  VACCINATIONS Recommended: Vaccination are up to date.  Importance discussed, risk being unvaccinated reviewed.  Questions answered  Smoking status- NON SMOKER  Follow up PCP/Specialist PMHx and Labs  Myriad: Does not qualify.  TRACK MENSES, RTO if <q21d, >7d long, heavy or painful.    All BIRTH CONTROL options R/B/A/SE/E of each reviewed in detail.  OCP/hormone use risk THROMBOEMBOLIC RISK reviewed.     SAFE SEX/condoms importance reviewed.      She understands the importance of having any ordered tests to be performed in a timely fashion.  The risks of not performing them include, but are not limited to, advanced cancer stages, bone loss from osteoporosis and/or subsequent increase in morbidity and/or mortality.  She is encouraged to review her results online and/or contact or office if she has questions.     Follow Up:  Return in about 1 year (around 11/8/2023).        Kelly Alves, SULMA  11/08/2022

## 2022-11-13 LAB
CYTOLOGIST CVX/VAG CYTO: NORMAL
CYTOLOGY CVX/VAG DOC CYTO: NORMAL
CYTOLOGY CVX/VAG DOC THIN PREP: NORMAL
DX ICD CODE: NORMAL
HIV 1 & 2 AB SER-IMP: NORMAL
HPV I/H RISK 4 DNA CVX QL PROBE+SIG AMP: NEGATIVE
OTHER STN SPEC: NORMAL
STAT OF ADQ CVX/VAG CYTO-IMP: NORMAL

## 2023-05-03 NOTE — PROGRESS NOTES
GYN Problem/Follow Up Visit    Chief Complaint   Patient presents with   • Follow-up     Vaginal Discomfort & Disc Switching BC          HPI  Ashley Lobo is a 27 y.o. female, , who presents for increased menstrual cramping for past 2 months. Menstrual cramps dull ache to sharp at times, right side greater than left, comes and goes 2 weeks out of the month, unrelated to menses. No menses for 2 months on Micronor. Will experience moodiness and cramping then no bleeding following.  Good pill taker, denies missed pills, sexually active new partner in the past year    Hx of PCOS  Hx of ovarian cysts  Previous check tsh/prolactin has been normal, 6-  Prediabetic    Vaginal irritation and mild itch x 2 weeks, no discharge  Mild urinary frequency    Hx of constipation for past month, has upcoming appt with PCP in August    PDF Report (2022 09:38)     Additional OB/GYN History   No LMP recorded. (Menstrual status: Oral contraceptives).  Current contraception: contraceptive methods: Oral progesterone-only contraceptive    Past Medical History:   Diagnosis Date   • Abnormal Pap smear of cervix    • Anxiety    • Arthritis    • Bipolar 1 disorder 2018   • Cataract    • Chlamydia    • cold sores    • Depression    • HPV (human papilloma virus) infection    • Hypertension    • Left breast mass     X2, s/p biopsy, benign   • Scoliosis    • Seasonal allergies    • Severe dysplasia of cervix (HERI III) 2021   • Urogenital trichomoniasis       Past Surgical History:   Procedure Laterality Date   • BACK SURGERY      TAVAREZ RODS FOR SCOLIOSIS   • BREAST BIOPSY Left 2017    Procedure: LEFT BREAST BIOPSY WITH ULTRASOUND NEEDLE LOCALIZATION AND EXCISIONAL BIOPSY OF SECOND MASS IN LEFT BREAST;  Surgeon: Duc Toribio MD;  Location[facility];  Service:    • COLPOSCOPY N/A 10/27/2021    Procedure: COLPOSCOPY;  Surgeon: Irma Almazan DO;  Location: [facility] MAIN OR;  Service:  Patient does report depression over continued chronic hospital care and chronic illness  Pastoral care to speak with patient  She may benefit from outpatient counseling  She states she would be agreeable to possible outpatient counseling for 1 or 2 sessions but not forever  She raised the issue herself  Her affect is rather flat    · Spiritual care consult for now  · In review of neuropsychology note patient is severely depressed refusing antidepressant medication "Obstetrics/Gynecology;  Laterality: N/A;   • LEEP N/A 10/27/2021    HGSIL CIN3, margins clear: Procedure: LOOP ELECTROCAUTERY EXCISION PROCEDURE;  Surgeon: Irma Almazan DO;  Location: [facility] MAIN OR;  Service: Obstetrics/Gynecology;  Laterality: N/A;      Family History   Problem Relation Age of Onset   • Hyperlipidemia Father    • Diabetes Mother    • Hyperlipidemia Mother    • Hypertension Mother    • Alcohol abuse Paternal Grandfather    • Birth defects Paternal Grandfather    • Heart attack Paternal Grandfather    • Hyperlipidemia Paternal Grandfather    • Colon cancer Maternal Grandmother    • COPD Maternal Grandmother         great grandmother   • Depression Maternal Grandmother    • Hearing loss Maternal Grandmother    • Heart attack Maternal Grandfather    • Hyperlipidemia Maternal Grandfather    • Cervical cancer Paternal Cousin    • Breast cancer Neg Hx    • Uterine cancer Neg Hx      Allergies as of 05/04/2023   • (No Known Allergies)      The additional following portions of the patient's history were reviewed and updated as appropriate: allergies, current medications, past family history, past medical history, past social history, past surgical history and problem list.    Review of Systems    See HPI for pertinent ROS    Objective   /81   Pulse 81   Ht 167.6 cm (66\")   Wt 104 kg (229 lb)   BMI 36.96 kg/m²     Physical Exam  Vitals and nursing note reviewed. Exam conducted with a chaperone present.   Constitutional:       Appearance: Normal appearance.   Cardiovascular:      Rate and Rhythm: Normal rate.   Pulmonary:      Effort: Pulmonary effort is normal.   Genitourinary:     General: Normal vulva.      Vagina: Vaginal discharge (scant thin white) present.      Cervix: Normal.      Uterus: Normal. Tender (mildly).       Adnexa: Right adnexa normal and left adnexa normal.   Lymphadenopathy:      Lower Body: No right inguinal adenopathy. No left inguinal adenopathy.   Skin:     General: Skin is " warm and dry.   Neurological:      Mental Status: She is alert and oriented to person, place, and time.            Assessment and Plan    Diagnoses and all orders for this visit:    1. Pelvic pain (Primary)  -     Chlamydia trachomatis, Neisseria gonorrhoeae, PCR - Swab, Cervix  -     Gardnerella vaginalis, Trichomonas vaginalis, Candida albicans, DNA - Swab, Vagina  -     POC Pregnancy, Urine  -     US Pelvis Transvaginal Non OB; Future  -     POC Urinalysis Dipstick    2. Vaginal irritation  -     Chlamydia trachomatis, Neisseria gonorrhoeae, PCR - Swab, Cervix  -     Gardnerella vaginalis, Trichomonas vaginalis, Candida albicans, DNA - Swab, Vagina    3. Constipation, unspecified constipation type      HCG, Urine, QL   Date Value Ref Range Status   05/04/2023 Negative Negative Final      Specific Gravity    Date Value Ref Range Status   05/04/2023 1.015 1.005 - 1.030 Final     pH, Urine   Date Value Ref Range Status   05/04/2023 6.5 5.0 - 8.0 Final     Leukocytes   Date Value Ref Range Status   05/04/2023 Negative Negative Final     Nitrite, UA   Date Value Ref Range Status   05/04/2023 Negative Negative Final     Protein, POC   Date Value Ref Range Status   05/04/2023 Negative Negative mg/dL Final     Glucose, UA   Date Value Ref Range Status   05/04/2023 Negative Negative mg/dL Final     Ketones, UA   Date Value Ref Range Status   05/04/2023 Negative Negative Final     Urobilinogen, UA   Date Value Ref Range Status   05/04/2023 Normal Normal, 0.2 E.U./dL Final     Bilirubin   Date Value Ref Range Status   05/04/2023 Negative Negative Final     Blood, UA   Date Value Ref Range Status   05/04/2023 Negative Negative Final     Lot Number   Date Value Ref Range Status   05/04/2023 [id number]  Final     Expiration Date   Date Value Ref Range Status   05/04/2023 5/31/2024  Final      Counseling:  • All treatment options with regard to pt hx NLT hormonal, surgical, expectant R/B/A/SE/E   • Desires to continue Progestin  only pill at this time.  • Fu after complete workup  • PCP for evaluation of constipation       She understands the importance of having any ordered tests to be performed in a timely fashion.  The risks of not performing them include, but are not limited to, advanced cancer stages, bone loss from osteoporosis and/or subsequent increase in morbidity and/or mortality.  She is encouraged to review her results online and/or contact or office if she has questions.     Follow Up:  Return for after completion of the ultrasound.        Kelly Alves, APRN  05/04/2023

## 2023-05-04 ENCOUNTER — OFFICE VISIT (OUTPATIENT)
Dept: OBSTETRICS AND GYNECOLOGY | Facility: CLINIC | Age: 28
End: 2023-05-04
Payer: COMMERCIAL

## 2023-05-04 VITALS
DIASTOLIC BLOOD PRESSURE: 81 MMHG | HEIGHT: 66 IN | WEIGHT: 229 LBS | BODY MASS INDEX: 36.8 KG/M2 | SYSTOLIC BLOOD PRESSURE: 126 MMHG | HEART RATE: 81 BPM

## 2023-05-04 DIAGNOSIS — R10.2 PELVIC PAIN: Primary | ICD-10-CM

## 2023-05-04 DIAGNOSIS — N89.8 VAGINAL IRRITATION: ICD-10-CM

## 2023-05-04 DIAGNOSIS — K59.00 CONSTIPATION, UNSPECIFIED CONSTIPATION TYPE: ICD-10-CM

## 2023-05-04 LAB
B-HCG UR QL: NEGATIVE
BILIRUB BLD-MCNC: NEGATIVE MG/DL
C TRACH RRNA CVX QL NAA+PROBE: NOT DETECTED
CANDIDA SPECIES: NEGATIVE
EXPIRATION DATE: NORMAL
GARDNERELLA VAGINALIS: NEGATIVE
GLUCOSE UR STRIP-MCNC: NEGATIVE MG/DL
INTERNAL NEGATIVE CONTROL: NORMAL
INTERNAL POSITIVE CONTROL: NORMAL
KETONES UR QL: NEGATIVE
LEUKOCYTE EST, POC: NEGATIVE
Lab: NORMAL
N GONORRHOEA RRNA SPEC QL NAA+PROBE: NOT DETECTED
NITRITE UR-MCNC: NEGATIVE MG/ML
PH UR: 6.5 [PH] (ref 5–8)
PROT UR STRIP-MCNC: NEGATIVE MG/DL
RBC # UR STRIP: NEGATIVE /UL
SP GR UR: 1.01 (ref 1–1.03)
T VAGINALIS DNA VAG QL PROBE+SIG AMP: NEGATIVE
UROBILINOGEN UR QL: NORMAL

## 2023-05-04 PROCEDURE — 87480 CANDIDA DNA DIR PROBE: CPT | Performed by: NURSE PRACTITIONER

## 2023-05-04 PROCEDURE — 87660 TRICHOMONAS VAGIN DIR PROBE: CPT | Performed by: NURSE PRACTITIONER

## 2023-05-04 PROCEDURE — 87591 N.GONORRHOEAE DNA AMP PROB: CPT | Performed by: NURSE PRACTITIONER

## 2023-05-04 PROCEDURE — 87491 CHLMYD TRACH DNA AMP PROBE: CPT | Performed by: NURSE PRACTITIONER

## 2023-05-04 PROCEDURE — 87510 GARDNER VAG DNA DIR PROBE: CPT | Performed by: NURSE PRACTITIONER

## 2023-05-04 RX ORDER — DULOXETIN HYDROCHLORIDE 60 MG/1
CAPSULE, DELAYED RELEASE ORAL
COMMUNITY
Start: 2023-03-04

## 2023-06-09 ENCOUNTER — HOSPITAL ENCOUNTER (OUTPATIENT)
Dept: ULTRASOUND IMAGING | Facility: HOSPITAL | Age: 28
Discharge: HOME | End: 2023-06-09
Payer: COMMERCIAL

## 2023-06-09 DIAGNOSIS — R10.2 PELVIC PAIN: ICD-10-CM

## 2023-06-09 PROCEDURE — 76830 TRANSVAGINAL US NON-OB: CPT

## 2023-06-12 ENCOUNTER — TELEPHONE (OUTPATIENT)
Dept: OBSTETRICS AND GYNECOLOGY | Facility: CLINIC | Age: 28
End: 2023-06-12
Payer: COMMERCIAL

## 2023-06-12 NOTE — TELEPHONE ENCOUNTER
----- Message from SULMA Clifford sent at 6/9/2023  2:47 PM EDT -----  Normal pelvic ultrasound, normal vaginal cultures. Recommend follow up with PCP if pelvic/abdominal pain continues. Recommend treating constipation- reported history constipation, may use otc miralax, 1 cap full in glass of water 1-2 times daily to achieve daily soft bowel movement. Follow up with me if symptoms persist or worsen once treated constipation and fu with PCP to discuss alternative etiologies of abdominal/pelvic pain.

## 2023-10-09 DIAGNOSIS — N91.4 SECONDARY OLIGOMENORRHEA: ICD-10-CM

## 2023-10-09 RX ORDER — NORETHINDRONE 0.35 MG/1
1 TABLET ORAL DAILY
Qty: 84 TABLET | Refills: 0 | Status: SHIPPED | OUTPATIENT
Start: 2023-10-09

## (undated) DEVICE — LITHOTOMY-SLINGS: Brand: MEDLINE INDUSTRIES, INC.

## (undated) DEVICE — DRAPE,UNDERBUTTOCKS,PCH,STERILE: Brand: MEDLINE

## (undated) DEVICE — PAD SANI MAXI W/ADHS SNG WRP 11IN

## (undated) DEVICE — SYR CONTRL LUERLOK 10CC

## (undated) DEVICE — SLV SCD LEG COMFORT KENDALLSCD MD REPROC

## (undated) DEVICE — SPNG GZ WOVN 4X4IN 12PLY 10/BX STRL

## (undated) DEVICE — ENCORE® LATEX ORTHO SIZE 8, STERILE LATEX POWDER-FREE SURGICAL GLOVE: Brand: ENCORE

## (undated) DEVICE — NEEDLE,18GX1.5",REG,BEVEL: Brand: MEDLINE

## (undated) DEVICE — TOWEL,OR,DSP,ST,BLUE,STD,4/PK,20PK/CS: Brand: MEDLINE

## (undated) DEVICE — STANDARD HYPODERMIC NEEDLE,POLYPROPYLENE HUB: Brand: MONOJECT

## (undated) DEVICE — APPL CHLORAPREP W/TINT 26ML ORNG

## (undated) DEVICE — NDL SPINE 22G 31/2IN BLK

## (undated) DEVICE — PROCTO SWABS: Brand: DEROYAL

## (undated) DEVICE — MEDI-VAC NON-CONDUCTIVE SUCTION TUBING: Brand: CARDINAL HEALTH

## (undated) DEVICE — NDL HYPO PRECISIONGLIDE REG 22G 1 1/2

## (undated) DEVICE — STRAP STIRUP WO/ RNG

## (undated) DEVICE — PREP TRAY WITH CHG: Brand: MEDLINE INDUSTRIES, INC.

## (undated) DEVICE — 3M™ STERI-STRIP™ REINFORCED ADHESIVE SKIN CLOSURES, R1547, 1/2 IN X 4 IN (12 MM X 100 MM), 6 STRIPS/ENVELOPE: Brand: 3M™ STERI-STRIP™

## (undated) DEVICE — STERILE POLYISOPRENE POWDER-FREE SURGICAL GLOVES: Brand: PROTEXIS

## (undated) DEVICE — UNDYED BRAIDED (POLYGLACTIN 910), SYNTHETIC ABSORBABLE SUTURE: Brand: COATED VICRYL

## (undated) DEVICE — PK PROC MINOR TOWER 40

## (undated) DEVICE — 3M™ STERI-STRIP™ COMPOUND BENZOIN TINCTURE 40 BAGS/CARTON 4 CARTONS/CASE C1544: Brand: 3M™ STERI-STRIP™

## (undated) DEVICE — COAGULATOR SXN FTSWTCH 10F6IN

## (undated) DEVICE — PACK,LITHOTOMY,PK I: Brand: MEDLINE

## (undated) DEVICE — CATH URETH INTRMIT ALLPURP LTX 16F RED

## (undated) DEVICE — STERILE POLYISOPRENE POWDER-FREE SURGICAL GLOVES WITH EMOLLIENT COATING: Brand: PROTEXIS

## (undated) DEVICE — ELECTRD BLD EZ CLN MOD 2.5IN

## (undated) DEVICE — ELECTRD LP COVIDIEN LLETZ TUNG 10X10MM

## (undated) DEVICE — DRSNG SURESITE WNDW 4X4.5

## (undated) DEVICE — SUT SILK 2/0 FS BLK 18IN 685G

## (undated) DEVICE — NDL HYPO PRECISIONGLIDE REG 25G 1 1/2

## (undated) DEVICE — SUT VIC 3/0 SH 27IN J416H

## (undated) DEVICE — PAD GRND REM POLYHESIVE A/ DISP

## (undated) DEVICE — NON-ADHERENT PADS PREPACK: Brand: TELFA

## (undated) DEVICE — MINOR-LF: Brand: MEDLINE INDUSTRIES, INC.